# Patient Record
Sex: FEMALE | Race: WHITE
[De-identification: names, ages, dates, MRNs, and addresses within clinical notes are randomized per-mention and may not be internally consistent; named-entity substitution may affect disease eponyms.]

---

## 2020-08-30 ENCOUNTER — HOSPITAL ENCOUNTER (OUTPATIENT)
Dept: HOSPITAL 33 - ED | Age: 60
Setting detail: OBSERVATION
LOS: 1 days | Discharge: HOME | End: 2020-08-31
Attending: INTERNAL MEDICINE | Admitting: INTERNAL MEDICINE
Payer: MEDICAID

## 2020-08-30 DIAGNOSIS — J18.9: Primary | ICD-10-CM

## 2020-08-30 DIAGNOSIS — C34.90: ICD-10-CM

## 2020-08-30 DIAGNOSIS — C50.919: ICD-10-CM

## 2020-08-30 DIAGNOSIS — J44.9: ICD-10-CM

## 2020-08-30 DIAGNOSIS — I10: ICD-10-CM

## 2020-08-30 DIAGNOSIS — E78.5: ICD-10-CM

## 2020-08-30 DIAGNOSIS — Z85.3: ICD-10-CM

## 2020-08-30 DIAGNOSIS — E11.9: ICD-10-CM

## 2020-08-30 DIAGNOSIS — Z79.899: ICD-10-CM

## 2020-08-30 DIAGNOSIS — E78.00: ICD-10-CM

## 2020-08-30 LAB
ALBUMIN SERPL-MCNC: 4.1 G/DL (ref 3.5–5)
ALP SERPL-CCNC: 144 U/L (ref 38–126)
ALT SERPL-CCNC: 41 U/L (ref 0–35)
ANION GAP SERPL CALC-SCNC: 13.1 MEQ/L (ref 5–15)
AST SERPL QL: 81 U/L (ref 14–36)
BASOPHILS # BLD AUTO: 0.04 10*3/UL (ref 0–0.4)
BASOPHILS NFR BLD AUTO: 0.6 % (ref 0–0.4)
BILIRUB BLD-MCNC: 0.3 MG/DL (ref 0.2–1.3)
BLD SMEAR INTERP: YES
BNP SERPL-MCNC: 43.6 PG/ML (ref 0–900)
BUN SERPL-MCNC: 15 MG/DL (ref 7–17)
CALCIUM SPEC-MCNC: 8.8 MG/DL (ref 8.4–10.2)
CHLORIDE SERPL-SCNC: 105 MMOL/L (ref 98–107)
CO2 SERPL-SCNC: 23 MMOL/L (ref 22–30)
CREAT SERPL-MCNC: 0.84 MG/DL (ref 0.52–1.04)
EOSINOPHIL # BLD AUTO: 0.11 10*3/UL (ref 0–0.5)
FLUAV AG NPH QL IA: NEGATIVE
FLUBV AG NPH QL IA: NEGATIVE
GFR SERPLBLD BASED ON 1.73 SQ M-ARVRAT: > 60 ML/MIN
GLUCOSE SERPL-MCNC: 130 MG/DL (ref 74–106)
GLUCOSE UR-MCNC: NEGATIVE MG/DL
HCT VFR BLD AUTO: 28.1 % (ref 35–47)
HGB BLD-MCNC: 8 GM/DL (ref 12–16)
LYMPHOCYTES # SPEC AUTO: 2.1 10*3/UL (ref 1–4.6)
MAGNESIUM SERPL-MCNC: 2.1 MG/DL (ref 1.6–2.3)
MCH RBC QN AUTO: 22.5 PG (ref 26–32)
MCHC RBC AUTO-ENTMCNC: 28.5 G/DL (ref 32–36)
MONOCYTES # BLD AUTO: 0.4 10*3/UL (ref 0–1.3)
PLATELET # BLD AUTO: 158 K/MM3 (ref 150–450)
POTASSIUM SERPLBLD-SCNC: 3.5 MMOL/L (ref 3.5–5.1)
PROT SERPL-MCNC: 7.3 G/DL (ref 6.3–8.2)
PROT UR STRIP-MCNC: NEGATIVE MG/DL
RBC # BLD AUTO: 3.55 M/MM3 (ref 4.1–5.4)
RBC #/AREA URNS HPF: (no result) /HPF (ref 0–2)
RSV AG SPEC QL IA: NEGATIVE
SODIUM SERPL-SCNC: 137 MMOL/L (ref 137–145)
WBC # BLD AUTO: 6.3 K/MM3 (ref 4–10.5)
WBC #/AREA URNS HPF: (no result) /HPF (ref 0–5)

## 2020-08-30 PROCEDURE — 96368 THER/DIAG CONCURRENT INF: CPT

## 2020-08-30 PROCEDURE — 85025 COMPLETE CBC W/AUTO DIFF WBC: CPT

## 2020-08-30 PROCEDURE — 83880 ASSAY OF NATRIURETIC PEPTIDE: CPT

## 2020-08-30 PROCEDURE — 94640 AIRWAY INHALATION TREATMENT: CPT

## 2020-08-30 PROCEDURE — 93041 RHYTHM ECG TRACING: CPT

## 2020-08-30 PROCEDURE — 84484 ASSAY OF TROPONIN QUANT: CPT

## 2020-08-30 PROCEDURE — 83735 ASSAY OF MAGNESIUM: CPT

## 2020-08-30 PROCEDURE — 71045 X-RAY EXAM CHEST 1 VIEW: CPT

## 2020-08-30 PROCEDURE — G0378 HOSPITAL OBSERVATION PER HR: HCPCS

## 2020-08-30 PROCEDURE — 74150 CT ABDOMEN W/O CONTRAST: CPT

## 2020-08-30 PROCEDURE — 96365 THER/PROPH/DIAG IV INF INIT: CPT

## 2020-08-30 PROCEDURE — 94762 N-INVAS EAR/PLS OXIMTRY CONT: CPT

## 2020-08-30 PROCEDURE — 96375 TX/PRO/DX INJ NEW DRUG ADDON: CPT

## 2020-08-30 PROCEDURE — 80053 COMPREHEN METABOLIC PANEL: CPT

## 2020-08-30 PROCEDURE — 87631 RESP VIRUS 3-5 TARGETS: CPT

## 2020-08-30 PROCEDURE — 99291 CRITICAL CARE FIRST HOUR: CPT

## 2020-08-30 PROCEDURE — 87086 URINE CULTURE/COLONY COUNT: CPT

## 2020-08-30 PROCEDURE — 96374 THER/PROPH/DIAG INJ IV PUSH: CPT

## 2020-08-30 PROCEDURE — 93268 ECG RECORD/REVIEW: CPT

## 2020-08-30 PROCEDURE — 36415 COLL VENOUS BLD VENIPUNCTURE: CPT

## 2020-08-30 PROCEDURE — U0003 INFECTIOUS AGENT DETECTION BY NUCLEIC ACID (DNA OR RNA); SEVERE ACUTE RESPIRATORY SYNDROME CORONAVIRUS 2 (SARS-COV-2) (CORONAVIRUS DISEASE [COVID-19]), AMPLIFIED PROBE TECHNIQUE, MAKING USE OF HIGH THROUGHPUT TECHNOLOGIES AS DESCRIBED BY CMS-2020-01-R: HCPCS

## 2020-08-30 PROCEDURE — 81001 URINALYSIS AUTO W/SCOPE: CPT

## 2020-08-30 PROCEDURE — 99285 EMERGENCY DEPT VISIT HI MDM: CPT

## 2020-08-30 PROCEDURE — 83605 ASSAY OF LACTIC ACID: CPT

## 2020-08-30 PROCEDURE — 36000 PLACE NEEDLE IN VEIN: CPT

## 2020-08-30 PROCEDURE — 51702 INSERT TEMP BLADDER CATH: CPT

## 2020-08-30 PROCEDURE — 70450 CT HEAD/BRAIN W/O DYE: CPT

## 2020-08-30 PROCEDURE — 93005 ELECTROCARDIOGRAM TRACING: CPT

## 2020-08-30 PROCEDURE — 87040 BLOOD CULTURE FOR BACTERIA: CPT

## 2020-08-30 RX ADMIN — THERA TABS SCH TAB: TAB at 13:45

## 2020-08-30 RX ADMIN — LORAZEPAM PRN MG: 2 INJECTION, SOLUTION INTRAMUSCULAR; INTRAVENOUS at 13:40

## 2020-08-30 RX ADMIN — MORPHINE SULFATE PRN MG: 4 INJECTION, SOLUTION INTRAMUSCULAR; INTRAVENOUS at 10:55

## 2020-08-30 RX ADMIN — FAMOTIDINE SCH MG: 10 INJECTION INTRAVENOUS at 21:34

## 2020-08-30 RX ADMIN — LORAZEPAM PRN MG: 2 INJECTION, SOLUTION INTRAMUSCULAR; INTRAVENOUS at 22:19

## 2020-08-30 RX ADMIN — VENLAFAXINE HYDROCHLORIDE SCH MG: 75 CAPSULE, EXTENDED RELEASE ORAL at 13:46

## 2020-08-30 RX ADMIN — MORPHINE SULFATE PRN MG: 4 INJECTION, SOLUTION INTRAMUSCULAR; INTRAVENOUS at 18:01

## 2020-08-30 RX ADMIN — MORPHINE SULFATE PRN MG: 4 INJECTION, SOLUTION INTRAMUSCULAR; INTRAVENOUS at 23:49

## 2020-08-30 RX ADMIN — FUROSEMIDE SCH MG: 40 TABLET ORAL at 13:46

## 2020-08-30 RX ADMIN — GABAPENTIN SCH MG: 300 CAPSULE ORAL at 21:34

## 2020-08-30 RX ADMIN — LORAZEPAM PRN MG: 2 INJECTION, SOLUTION INTRAMUSCULAR; INTRAVENOUS at 17:59

## 2020-08-30 RX ADMIN — MORPHINE SULFATE PRN MG: 4 INJECTION, SOLUTION INTRAMUSCULAR; INTRAVENOUS at 14:00

## 2020-08-30 RX ADMIN — MORPHINE SULFATE PRN MG: 4 INJECTION, SOLUTION INTRAMUSCULAR; INTRAVENOUS at 21:33

## 2020-08-30 RX ADMIN — FAMOTIDINE SCH MG: 10 INJECTION INTRAVENOUS at 10:56

## 2020-08-30 RX ADMIN — Medication SCH TAB: at 13:46

## 2020-08-30 RX ADMIN — METOPROLOL SUCCINATE SCH MG: 50 TABLET, EXTENDED RELEASE ORAL at 13:46

## 2020-08-30 RX ADMIN — GABAPENTIN SCH MG: 300 CAPSULE ORAL at 13:46

## 2020-08-30 NOTE — ERPHSYRPT
- History of Present Illness


Source: patient, EMS


Exam Limitations: no limitations


Patient Subjective Stated Complaint: "My chest hurts and I can't breathe."


Triage Nursing Assessment: Pt presented alert et oriented x3 answering questions

appropriately. Pt presented in obvious distress of breathing with noted 4 words 

dyspnea. pt reported recently finishing chemotherapy for lung cancer. Pt 

reported right sided chest pain x4 days with acute increase in pain this 

morning. Pt denied headache, dizziness, visual/auditory changes. Neck supple 

non-tender without lymphadenopathy. Symmetrical chest expansion. heart tones 

regular/clear S2 S2. Lungs with restricted airflow throughout and diffuse fine 

crackles in the bilateral bases. Abdomen obese non-tender with bowel sounds 

present in all quadrants.


Timing/Duration: day(s) (4), gradual onset, worse


Activities at Onset: rest


Severity of Dyspnea-Max: severe


Severity of Dyspnea-Current: moderate


Modifying Factors: Improves With: albuterol nebulizer, coughing, deep breath, 

oxygen


Associated Symptoms: cough, chest pain/discomfort, fever, wheezing, heaviness, 

productive cough


Hx Tetanus, Diphtheria Vaccination/Date Given: Yes


Hx Influenza Vaccination/Date Given: Yes


Hx Pneumococcal Vaccination/Date Given: Yes





<GEORGIA PEREZ - Last Filed: 08/30/20 07:12>





<ASHLEE SHAW - Last Filed: 08/30/20 08:17>





- History of Present Illness


Time Seen by Provider: 08/30/20 05:16


Physician History: 





60 years old female with history of hypertension, hyperlipidemia, lung cancer 

currently on chemotherapy presented in the ER with chief complaint of increasing

shortness of breath and cough productive of yellow-green sputum, moderate in 

amount associated with fever off and on for the last 3 days with a T-max of 103.

 Patient has taken Tylenol lost night and currently afebrile.  Complaining of 

generalized chest soreness more pain in the right lower chest especially with 

deep breathing.  She is given DuoNeb and Solu-Medrol by EMS in route to ER and 

is feeling better now but still have dyspnea. (GEORGIA PEREZ)


Allergies/Adverse Reactions: 








No Known Drug Allergies Allergy (Unverified 08/30/20 05:14)


   





Home Medications: 








Budesonide/Formoterol Fumarate [Budesonide-Formoterol 80-4.5] 2 puff PO BID 

08/30/20 [History]


Gabapentin 1 cap PO TID 08/30/20 [History]


Metoprolol Succinate 1 tab PO DAILY 08/30/20 [History]


Omeprazole 1 cap PO DAILY 08/30/20 [History]


Venlafaxine HCl ER 75 mg*** [Effexor XR 75 MG***] 3 cap PO DAILY 08/30/20 

[History]








Travel Risk





- International Travel


Have you traveled outside of the country in past 3 weeks: No





- Coronavirus Screening


Are you exhibiting any of the following symptoms?: Yes


Symptoms: Cough: New Onset, Shortness of Breath


Close contact with a COVID-19 positive Pt in past 14-21 Days: No





<GEORGIA PEREZ - Last Filed: 08/30/20 07:12>





- Review of Systems


Constitutional: Fever, Chills, Fatigue, Weakness


Eyes: No Symptoms


Ears, Nose, & Throat: No Symptoms


Respiratory: Cough, Dyspnea, Dyspnea on Exertion (BRUNNER), Wheezing


Cardiac: Chest Pain


Abdominal/Gastrointestinal: Nausea


Genitourinary Symptoms: Urinary Retention


Musculoskeletal: Myalgias


Skin: No Symptoms


Neurological: No Symptoms


Psychological: No Symptoms


Endocrine: No Symptoms


Hematologic/Lymphatic: No Symptoms


Immunological/Allergic: No Symptoms





<GEORGIA PEREZ - Last Filed: 08/30/20 07:12>





- Past Medical History


Pertinent Past Medical History: Yes


Neurological History: Migraines, Seizures


ENT History: No Pertinent History


Cardiac History: High Cholesterol, Hypertension, Myocardial Infarction (MI)


Respiratory History: Asthma, COPD, Lung Cancer


Endocrine Medical History: Diabetes Type I


Musculoskeletal History: No Pertinent History


GI Medical History: GERD


 History: Renal Disease


Psycho-Social History: Depression


Female Reproductive Disorders: Breast Cancer, Uterine Cancer





- Past Surgical History


Past Surgical History: Yes


Neuro Surgical History: No Pertinent History


Cardiac: Cardiac Catheterization


Respiratory: No Pertinent History


Gastrointestinal: Cholecystectomy


Genitourinary: No Pertinent History


Musculoskeletal: No Pertinent History


Female Surgical History: Hysterectomy





- Social History


Smoking Status: Current some day smoker


Exposure to second hand smoke: Yes


Drug Use: none


Patient Lives Alone: Yes





<GEORGIA PEREZ - Last Filed: 08/30/20 07:12>





- Physical Exam


General Appearance: no apparent distress, alert


Eye Exam: PERRL/EOMI, eyes nml inspection


Ears, Nose, Throat Exam: hearing grossly normal, pharyngeal erythema


Neck Exam: normal inspection, non-tender, supple


Respiratory Exam: diminished breath sounds, crackles/rales, wheezing


Cardiovascular/Chest Exam: normal heart sounds, regular rate/rhythm


Abdominal/Gastrointestinal Exam: soft, normal bowel sounds, No tenderness


Extremity Exam: non-tender


Skin Exam: normal color


**SpO2 Interpretation**: O2 applied


SpO2: 98


O2 Delivery: Nasal Cannula





<CHRIS,GEORGIA - Last Filed: 08/30/20 07:12>





- Physical Exam


Respiratory Exam: rhonchi





<ASHLEE SHAW - Last Filed: 08/30/20 08:17>





- Nursing Vital Signs


Nursing Vital Signs: 





                               Initial Vital Signs











Temperature  98.1 F   08/30/20 05:12


 


Pulse Rate  92 H  08/30/20 05:12


 


Respiratory Rate  22   08/30/20 05:12


 


Blood Pressure  123/75   08/30/20 05:12


 


O2 Sat by Pulse Oximetry  99   08/30/20 05:12








                                   Pain Scale











Pain Intensity                 4

















- Course


EKG Interpreted by Me: RATE (90), Sinus Rhythm, NORMAL AXIS, NORMAL INTERVALS, 

NORMAL QRS





<CHRIS,GEORGIA - Last Filed: 08/30/20 07:12>





- Radiology Exams


  ** Chest


X-ray Interpretation: Reviewed by me, Infiltrates (right lower lobe)





<VALERIA,ASHLEE - Last Filed: 08/30/20 08:17>


Ordered Tests: 





                               Active Orders 24 hr











 Category Date Time Status


 


 Cardiac Monitor STAT Care  08/30/20 05:21 Active


 


 EKG-ER Only STAT Care  08/30/20 05:20 Active


 


 Eddy [Catheter-Alberton Eddy] STAT Care  08/30/20 07:23 Active


 


 IV Insertion STAT Care  08/30/20 05:20 Active


 


 NPO (ED) STAT Care  08/30/20 05:20 Active


 


 CHEST 1 VIEW (PORTABLE) Stat Exams  08/30/20 05:21 Taken


 


 BLOOD CULTURE Stat Lab  08/30/20 06:12 Received


 


 CBC W DIFF Stat Lab  08/30/20 06:12 Completed


 


 CMP Stat Lab  08/30/20 06:12 Completed


 


 Lactic Acid Stat Lab  08/30/20 05:20 Completed


 


 MAGNESIUM Stat Lab  08/30/20 06:12 Completed


 


 NT PRO BNP Stat Lab  08/30/20 06:12 Completed


 


 TROPONIN Q3H Lab  08/30/20 06:12 Completed


 


 TROPONIN Q3H Lab  08/30/20 08:30 Ordered


 


 TROPONIN Q3H Lab  08/30/20 11:30 Ordered


 


 TROPONIN Q3H Lab  08/30/20 14:30 Ordered


 


 TROPONIN Q3H Lab  08/30/20 17:30 Ordered


 


 UA W/RFX UR CULTURE Stat Lab  08/30/20 07:23 Completed








Medication Summary














Discontinued Medications














Generic Name Dose Route Start Last Admin





  Trade Name Zeenat  PRN Reason Stop Dose Admin


 


Aspirin  324 mg  08/30/20 10:00  08/30/20 05:38





  Ecotrin 81 Mg***  PO  09/29/20 09:59  Not Given





  DAILY LISA  


 


Aspirin  324 mg  08/30/20 05:37  08/30/20 05:38





  Baby Aspirin 81 Mg Chew***  PO  08/30/20 05:38  324 mg





  STAT ONE   Administration


 


Fentanyl Citrate  50 mcg  08/30/20 08:02 





  Sublimaze 100 Mcg/2 Ml***  IV  08/30/20 08:03 





  STAT ONE  


 


Azithromycin  500 mg in 250 mls @ 250 mls/hr  08/30/20 06:46  08/30/20 07:20





  Zithromax 500 Mg/ 250 Ml Nacl Premix  IV  08/30/20 07:45  250 mls/hr





  STAT STA   250 mls/hr





    Administration


 


Ceftriaxone Sodium/Dextrose  2 g in 50 mls @ 100 mls/hr  08/30/20 06:46  

08/30/20 07:19





  Rocephin 2 Gm-D5w 50ml Bag**  IV  08/30/20 07:15  100 mls/hr





  STAT STA   100 mls/hr





    Administration


 


Azithromycin  Confirm  08/30/20 07:18 





  Zithromax 500 Mg/ 250 Ml Nacl Premix  Administered  08/30/20 07:19 





  Dose  





  500 mg in 250 mls @ ud  





  IV  





  .STK-MED ONE  


 


Ceftriaxone Sodium/Dextrose  Confirm  08/30/20 07:18 





  Rocephin 2 Gm-D5w 50ml Bag**  Administered  08/30/20 07:19 





  Dose  





  2 g in 50 mls @ ud  





  IV  





  .STK-MED ONE  


 


Lorazepam  1 mg  08/30/20 08:09 





  Ativan 2 Mg/1 Ml Vial***  IV  08/30/20 08:10 





  STAT ONE  


 


Morphine Sulfate  4 mg  08/30/20 06:34  08/30/20 06:36





  Morphine Sulfate 4 Mg Inj***  IV  08/30/20 06:35  4 mg





  STAT ONE   Administration


 


Morphine Sulfate  Confirm  08/30/20 06:36 





  Morphine Sulfate 4 Mg Inj***  Administered  08/30/20 06:37 





  Dose  





  4 mg  





  .ROUTE  





  .STK-MED ONE  











Lab/Rad Data: 





                           Laboratory Result Diagrams





                                 08/30/20 06:12 





                                 08/30/20 06:12 





                               Laboratory Results











  08/30/20 08/30/20 08/30/20 Range/Units





  07:23 06:12 06:12 


 


WBC     (4.0-10.5)  K/mm3


 


RBC     (4.1-5.4)  M/mm3


 


Hgb     (12.0-16.0)  gm/dl


 


Hct     (35-47)  %


 


MCV     ()  fl


 


MCH     (26-32)  pg


 


MCHC     (32-36)  g/dl


 


RDW     (11.5-14.0)  %


 


Plt Count     (150-450)  K/mm3


 


MPV     (7.5-11.0)  fl


 


Gran %     (36.0-66.0)  %


 


Eos # (Auto)     (0-0.5)  


 


Absolute Lymphs (auto)     (1.0-4.6)  


 


Absolute Monos (auto)     (0.0-1.3)  


 


Lymphocytes %     (24.0-44.0)  %


 


Monocytes %     (0.0-12.0)  %


 


Eosinophils %     (0.00-5.0)  %


 


Basophils %     (0.0-0.4)  %


 


Absolute Granulocytes     (1.4-6.9)  


 


Basophils #     (0-0.4)  


 


Sodium    137  (137-145)  mmol/L


 


Potassium    3.5  (3.5-5.1)  mmol/L


 


Chloride    105  ()  mmol/L


 


Carbon Dioxide    23  (22-30)  mmol/L


 


Anion Gap    13.1  (5-15)  MEQ/L


 


BUN    15  (7-17)  mg/dL


 


Creatinine    0.84  (0.52-1.04)  mg/dL


 


Estimated GFR    > 60.0  ML/MIN


 


Glucose    130 H  ()  mg/dL


 


Lactic Acid     (0.4-2.0)  


 


Calcium    8.8  (8.4-10.2)  mg/dL


 


Magnesium    2.1  (1.6-2.3)  mg/dL


 


Total Bilirubin    0.30  (0.2-1.3)  mg/dL


 


AST    81 H  (14-36)  U/L


 


ALT    41 H  (0-35)  U/L


 


Alkaline Phosphatase    144 H  ()  U/L


 


Troponin I   < 0.012   (0.000-0.034)  ng/mL


 


NT-Pro-B Natriuret Pep    43.6  (0-900)  pg/mL


 


Serum Total Protein    7.3  (6.3-8.2)  g/dL


 


Albumin    4.1  (3.5-5.0)  g/dL


 


Urine Color  STRAW    (YELLOW)  


 


Urine Appearance  CLEAR    (CLEAR)  


 


Urine pH  6.0    (5-6)  


 


Ur Specific Gravity  1.008    (1.005-1.025)  


 


Urine Protein  NEGATIVE    (Negative)  


 


Urine Ketones  NEGATIVE    (NEGATIVE)  


 


Urine Blood  NEGATIVE    (0-5)  Azeem/ul


 


Urine Nitrite  NEGATIVE    (NEGATIVE)  


 


Urine Bilirubin  NEGATIVE    (NEGATIVE)  


 


Urine Urobilinogen  NEGATIVE    (0-1)  mg/dL


 


Ur Leukocyte Esterase  NEGATIVE    (NEGATIVE)  


 


Urine WBC (Auto)  NONE    (0-5)  /HPF


 


Urine RBC (Auto)  NONE    (0-2)  /HPF


 


U Epithel Cells (Auto)  NONE    (FEW)  /HPF


 


Urine Bacteria (Auto)  NONE    (NEGATIVE)  /HPF


 


Urine Culture Reflexed  ORDERED SEPARATELY    (NO)  


 


Urine Glucose  NEGATIVE    (NEGATIVE)  mg/dL


 


Influenza Type A Ag     (NEGATIVE)  


 


Influenza Type B Ag     (NEGATIVE)  


 


RSV (PCR)     (Negative)  














  08/30/20 08/30/20 08/30/20 Range/Units





  06:12 06:10 05:20 


 


WBC  6.3    (4.0-10.5)  K/mm3


 


RBC  3.55 L    (4.1-5.4)  M/mm3


 


Hgb  8.0 L    (12.0-16.0)  gm/dl


 


Hct  28.1 L    (35-47)  %


 


MCV  79.2    ()  fl


 


MCH  22.5 L    (26-32)  pg


 


MCHC  28.5 L    (32-36)  g/dl


 


RDW  20.3 H    (11.5-14.0)  %


 


Plt Count  158    (150-450)  K/mm3


 


MPV  10.0    (7.5-11.0)  fl


 


Gran %  57.9    (36.0-66.0)  %


 


Eos # (Auto)  0.11    (0-0.5)  


 


Absolute Lymphs (auto)  2.10    (1.0-4.6)  


 


Absolute Monos (auto)  0.40    (0.0-1.3)  


 


Lymphocytes %  33.4    (24.0-44.0)  %


 


Monocytes %  6.4    (0.0-12.0)  %


 


Eosinophils %  1.7    (0.00-5.0)  %


 


Basophils %  0.6    (0.0-0.4)  %


 


Absolute Granulocytes  3.64    (1.4-6.9)  


 


Basophils #  0.04    (0-0.4)  


 


Sodium     (137-145)  mmol/L


 


Potassium     (3.5-5.1)  mmol/L


 


Chloride     ()  mmol/L


 


Carbon Dioxide     (22-30)  mmol/L


 


Anion Gap     (5-15)  MEQ/L


 


BUN     (7-17)  mg/dL


 


Creatinine     (0.52-1.04)  mg/dL


 


Estimated GFR     ML/MIN


 


Glucose     ()  mg/dL


 


Lactic Acid    1.3  (0.4-2.0)  


 


Calcium     (8.4-10.2)  mg/dL


 


Magnesium     (1.6-2.3)  mg/dL


 


Total Bilirubin     (0.2-1.3)  mg/dL


 


AST     (14-36)  U/L


 


ALT     (0-35)  U/L


 


Alkaline Phosphatase     ()  U/L


 


Troponin I     (0.000-0.034)  ng/mL


 


NT-Pro-B Natriuret Pep     (0-900)  pg/mL


 


Serum Total Protein     (6.3-8.2)  g/dL


 


Albumin     (3.5-5.0)  g/dL


 


Urine Color     (YELLOW)  


 


Urine Appearance     (CLEAR)  


 


Urine pH     (5-6)  


 


Ur Specific Gravity     (1.005-1.025)  


 


Urine Protein     (Negative)  


 


Urine Ketones     (NEGATIVE)  


 


Urine Blood     (0-5)  Azeem/ul


 


Urine Nitrite     (NEGATIVE)  


 


Urine Bilirubin     (NEGATIVE)  


 


Urine Urobilinogen     (0-1)  mg/dL


 


Ur Leukocyte Esterase     (NEGATIVE)  


 


Urine WBC (Auto)     (0-5)  /HPF


 


Urine RBC (Auto)     (0-2)  /HPF


 


U Epithel Cells (Auto)     (FEW)  /HPF


 


Urine Bacteria (Auto)     (NEGATIVE)  /HPF


 


Urine Culture Reflexed     (NO)  


 


Urine Glucose     (NEGATIVE)  mg/dL


 


Influenza Type A Ag   NEGATIVE   (NEGATIVE)  


 


Influenza Type B Ag   NEGATIVE   (NEGATIVE)  


 


RSV (PCR)   NEGATIVE   (Negative)  














- Progress


Progress: unchanged


Air Movement: fair


Blood Culture(s) Obtained: Yes


Antibiotics given: Yes


Discussed with : Maria Guadalupe


Will see patient in: hospital (observation)


Counseled pt/family regarding: lab results, diagnosis, need for follow-up, rad 

results, smoking cessation





<ASHLEE SHAW - Last Filed: 08/30/20 08:17>





<GEORGIA PERZE - Last Filed: 08/30/20 07:12>





- Departure


Departure Disposition: Observation


Critical Care Time: Yes


Critical Care Time(excluding separately billable procedures): Critical 30-74 

mins





<ASHLEE SHAW - Last Filed: 08/30/20 08:17>





- Departure


Clinical Impression: 


Pneumonia of right lung due to infectious organism


Qualifiers:


 Lung location: lower lobe of lung Qualified Code(s): J18.9 - Pneumonia, 

unspecified organism





Breast cancer in female


Qualifiers:


 Breast location: unspecified site of breast Estrogen receptor status: 

unspecified Laterality: right Qualified Code(s): C50.911 - Malignant neoplasm of

unspecified site of right female breast





Condition: Fair


Referrals: 


ASHLEE SHAW MD [ACTIVE STAFF] -

## 2020-08-30 NOTE — PCM.HP
History of Present Illness





- Chief Complaint


Chief Complaint: shortness of breath for 2-3 days, 


History of Present Illness: 


 is a 60 year old female. with history of hypertension, hyperlipidemia,

lung cancer currently on chemotherapy presented in the ER with chief complaint 

of increasing shortness of breath and cough productive of yellow-green sputum, 

moderate in amount associated with fever off and on for the last 3 days with a 

T-max of 103.  Patient has taken Tylenol lost night and currently afebrile.  

Complaining of generalized chest soreness more pain in the right lower chest 

especially with deep breathing.  She is given DuoNeb and Solu-Medrol by EMS in 

route to ER and is feeling better now but still have dyspnea.








- Review of Systems


Constitutional: No Fever, No Chills


Eyes: No Symptoms


Ears, Nose, & Throat: No Symptoms


Respiratory: Cough, Orthopnea, Short Of Breath


Cardiac: No Chest Pain, No Edema, No Syncope


Abdominal/Gastrointestinal: Abdominal Pain, No Nausea, No Vomiting, No Diarrhea


Genitourinary Symptoms: No Dysuria


Musculoskeletal: No Back Pain, No Neck Pain


Skin: No Rash


Neurological: No Dizziness, No Focal Weakness, No Sensory Changes


Psychological: No Symptoms


Endocrine: No Symptoms


Hematologic/Lymphatic: No Symptoms


Immunological/Allergic: No Symptoms





Medications & Allergies


Home Medications: 


                              Home Medication List





Budesonide/Formoterol Fumarate [Budesonide-Formoterol 80-4.5] 2 puff PO BID 

08/30/20 [History Confirmed 08/30/20]


Gabapentin 1 cap PO TID 08/30/20 [History Confirmed 08/30/20]


Metoprolol Succinate 1 tab PO DAILY 08/30/20 [History Confirmed 08/30/20]


Omeprazole 1 cap PO DAILY 08/30/20 [History Confirmed 08/30/20]


Venlafaxine HCl ER 75 mg*** [Effexor XR 75 MG***] 3 cap PO DAILY 08/30/20 

[History Confirmed 08/30/20]








Allergies/Adverse Reactions: 


                                    Allergies











Allergy/AdvReac Type Severity Reaction Status Date / Time


 


No Known Drug Allergies Allergy   Unverified 08/30/20 05:14














- Past Medical History


Past Medical History: Yes


Neurological History: Migraines, Seizures


ENT History: No Pertinent History


Cardiac History: High Cholesterol, Hypertension, Myocardial Infarction (MI)


Respiratory History: Asthma, COPD, Lung Cancer


Endocrine Medical History: Diabetes Type I


Musculoskelatal History: No Pertinent History


GI Medical History: GERD


 History: Renal Disease


Pyscho-Social History: Depression


Reproductive Disorders: Breast Cancer, Uterine Cancer





- Past Surgical History


Past Surgical History: Yes


Neuro Surgical History: No Pertinent History


Cardiac History: Cardiac Catheterization


Respiratory Surgery: No Pertinent History


GI Surgical History: Cholecystectomy


Genitourinary Surgical Hx: No Pertinent History


Musculskeletal Surgical Hx: No Pertinent History


Female Surgical History: Hysterectomy





- Social History


Smoking Status: Current some day smoker


Exposure to second hand smoke: Yes


Alcohol: Occasionally


Drug Use: none





- Physical Exam


Vital Signs: 


                               Vital Signs - 24 hr











  Temp Pulse Resp BP Pulse Ox


 


 08/30/20 08:24   102 H  18  139/96  99


 


 08/30/20 07:24   101 H  25 H  135/92  99


 


 08/30/20 07:12      98


 


 08/30/20 06:12   97 H  14  131/77  98


 


 08/30/20 05:12  98.1 F  92 H  20  123/75  100











General Appearance: no apparent distress, alert


Neurologic Exam: alert, oriented x 3, cooperative, normal mood/affect, nml 

cerebellar function, nml station & gait, sensation nml, No motor deficits


Eye Exam: PERRL/EOMI, eyes nml inspection


Ears, Nose, Throat Exam: normal ENT inspection, TMs normal, pharynx normal, 

moist mucous membranes


Neck Exam: normal inspection, non-tender, supple, full range of motion


Respiratory Exam: diminished breath sounds, crackles/rales, rhonchi, wheezing, 

No respiratory distress


Cardiovascular Exam: regular rate/rhythm, normal heart sounds, normal peripheral

pulses


Gastrointestinal/Abdomen Exam: soft, normal bowel sounds, No tenderness, No mass


Back Exam: normal inspection, normal range of motion, No CVA tenderness, No 

vertebral tenderness


Extremity Exam: normal inspection, normal range of motion, pelvis stable


Skin Exam: normal color, warm, dry, No rash


Lymphatic Exam: No adenopathy





Results





- Labs


Lab/Micro Results: 


                            Lab Results-Last 24 Hours











  08/30/20 08/30/20 08/30/20 Range/Units





  05:20 06:10 06:12 


 


WBC    6.3  (4.0-10.5)  K/mm3


 


RBC    3.55 L  (4.1-5.4)  M/mm3


 


Hgb    8.0 L  (12.0-16.0)  gm/dl


 


Hct    28.1 L  (35-47)  %


 


MCV    79.2  ()  fl


 


MCH    22.5 L  (26-32)  pg


 


MCHC    28.5 L  (32-36)  g/dl


 


RDW    20.3 H  (11.5-14.0)  %


 


Plt Count    158  (150-450)  K/mm3


 


MPV    10.0  (7.5-11.0)  fl


 


Gran %    57.9  (36.0-66.0)  %


 


Eos # (Auto)    0.11  (0-0.5)  


 


Absolute Lymphs (auto)    2.10  (1.0-4.6)  


 


Absolute Monos (auto)    0.40  (0.0-1.3)  


 


Lymphocytes %    33.4  (24.0-44.0)  %


 


Monocytes %    6.4  (0.0-12.0)  %


 


Eosinophils %    1.7  (0.00-5.0)  %


 


Basophils %    0.6  (0.0-0.4)  %


 


Absolute Granulocytes    3.64  (1.4-6.9)  


 


Basophils #    0.04  (0-0.4)  


 


Sodium     (137-145)  mmol/L


 


Potassium     (3.5-5.1)  mmol/L


 


Chloride     ()  mmol/L


 


Carbon Dioxide     (22-30)  mmol/L


 


Anion Gap     (5-15)  MEQ/L


 


BUN     (7-17)  mg/dL


 


Creatinine     (0.52-1.04)  mg/dL


 


Estimated GFR     ML/MIN


 


Glucose     ()  mg/dL


 


Lactic Acid  1.3    (0.4-2.0)  


 


Calcium     (8.4-10.2)  mg/dL


 


Magnesium     (1.6-2.3)  mg/dL


 


Total Bilirubin     (0.2-1.3)  mg/dL


 


AST     (14-36)  U/L


 


ALT     (0-35)  U/L


 


Alkaline Phosphatase     ()  U/L


 


Troponin I     (0.000-0.034)  ng/mL


 


NT-Pro-B Natriuret Pep     (0-900)  pg/mL


 


Serum Total Protein     (6.3-8.2)  g/dL


 


Albumin     (3.5-5.0)  g/dL


 


Urine Color     (YELLOW)  


 


Urine Appearance     (CLEAR)  


 


Urine pH     (5-6)  


 


Ur Specific Gravity     (1.005-1.025)  


 


Urine Protein     (Negative)  


 


Urine Ketones     (NEGATIVE)  


 


Urine Blood     (0-5)  Azeem/ul


 


Urine Nitrite     (NEGATIVE)  


 


Urine Bilirubin     (NEGATIVE)  


 


Urine Urobilinogen     (0-1)  mg/dL


 


Ur Leukocyte Esterase     (NEGATIVE)  


 


Urine WBC (Auto)     (0-5)  /HPF


 


Urine RBC (Auto)     (0-2)  /HPF


 


U Epithel Cells (Auto)     (FEW)  /HPF


 


Urine Bacteria (Auto)     (NEGATIVE)  /HPF


 


Urine Culture Reflexed     (NO)  


 


Urine Glucose     (NEGATIVE)  mg/dL


 


Influenza Type A Ag   NEGATIVE   (NEGATIVE)  


 


Influenza Type B Ag   NEGATIVE   (NEGATIVE)  


 


RSV (PCR)   NEGATIVE   (Negative)  


 


SARS-CoV-2 (PCR)     (NEGATIVE)  


 


Slides for Path Review    YES  














  08/30/20 08/30/20 08/30/20 Range/Units





  06:12 06:12 07:23 


 


WBC     (4.0-10.5)  K/mm3


 


RBC     (4.1-5.4)  M/mm3


 


Hgb     (12.0-16.0)  gm/dl


 


Hct     (35-47)  %


 


MCV     ()  fl


 


MCH     (26-32)  pg


 


MCHC     (32-36)  g/dl


 


RDW     (11.5-14.0)  %


 


Plt Count     (150-450)  K/mm3


 


MPV     (7.5-11.0)  fl


 


Gran %     (36.0-66.0)  %


 


Eos # (Auto)     (0-0.5)  


 


Absolute Lymphs (auto)     (1.0-4.6)  


 


Absolute Monos (auto)     (0.0-1.3)  


 


Lymphocytes %     (24.0-44.0)  %


 


Monocytes %     (0.0-12.0)  %


 


Eosinophils %     (0.00-5.0)  %


 


Basophils %     (0.0-0.4)  %


 


Absolute Granulocytes     (1.4-6.9)  


 


Basophils #     (0-0.4)  


 


Sodium  137    (137-145)  mmol/L


 


Potassium  3.5    (3.5-5.1)  mmol/L


 


Chloride  105    ()  mmol/L


 


Carbon Dioxide  23    (22-30)  mmol/L


 


Anion Gap  13.1    (5-15)  MEQ/L


 


BUN  15    (7-17)  mg/dL


 


Creatinine  0.84    (0.52-1.04)  mg/dL


 


Estimated GFR  > 60.0    ML/MIN


 


Glucose  130 H    ()  mg/dL


 


Lactic Acid     (0.4-2.0)  


 


Calcium  8.8    (8.4-10.2)  mg/dL


 


Magnesium  2.1    (1.6-2.3)  mg/dL


 


Total Bilirubin  0.30    (0.2-1.3)  mg/dL


 


AST  81 H    (14-36)  U/L


 


ALT  41 H    (0-35)  U/L


 


Alkaline Phosphatase  144 H    ()  U/L


 


Troponin I   < 0.012   (0.000-0.034)  ng/mL


 


NT-Pro-B Natriuret Pep  43.6    (0-900)  pg/mL


 


Serum Total Protein  7.3    (6.3-8.2)  g/dL


 


Albumin  4.1    (3.5-5.0)  g/dL


 


Urine Color    STRAW  (YELLOW)  


 


Urine Appearance    CLEAR  (CLEAR)  


 


Urine pH    6.0  (5-6)  


 


Ur Specific Gravity    1.008  (1.005-1.025)  


 


Urine Protein    NEGATIVE  (Negative)  


 


Urine Ketones    NEGATIVE  (NEGATIVE)  


 


Urine Blood    NEGATIVE  (0-5)  Azeem/ul


 


Urine Nitrite    NEGATIVE  (NEGATIVE)  


 


Urine Bilirubin    NEGATIVE  (NEGATIVE)  


 


Urine Urobilinogen    NEGATIVE  (0-1)  mg/dL


 


Ur Leukocyte Esterase    NEGATIVE  (NEGATIVE)  


 


Urine WBC (Auto)    NONE  (0-5)  /HPF


 


Urine RBC (Auto)    NONE  (0-2)  /HPF


 


U Epithel Cells (Auto)    NONE  (FEW)  /HPF


 


Urine Bacteria (Auto)    NONE  (NEGATIVE)  /HPF


 


Urine Culture Reflexed    ORDERED SEPARATELY  (NO)  


 


Urine Glucose    NEGATIVE  (NEGATIVE)  mg/dL


 


Influenza Type A Ag     (NEGATIVE)  


 


Influenza Type B Ag     (NEGATIVE)  


 


RSV (PCR)     (Negative)  


 


SARS-CoV-2 (PCR)     (NEGATIVE)  


 


Slides for Path Review     














  08/30/20 08/30/20 Range/Units





  08:44 Unknown 


 


WBC    (4.0-10.5)  K/mm3


 


RBC    (4.1-5.4)  M/mm3


 


Hgb    (12.0-16.0)  gm/dl


 


Hct    (35-47)  %


 


MCV    ()  fl


 


MCH    (26-32)  pg


 


MCHC    (32-36)  g/dl


 


RDW    (11.5-14.0)  %


 


Plt Count    (150-450)  K/mm3


 


MPV    (7.5-11.0)  fl


 


Gran %    (36.0-66.0)  %


 


Eos # (Auto)    (0-0.5)  


 


Absolute Lymphs (auto)    (1.0-4.6)  


 


Absolute Monos (auto)    (0.0-1.3)  


 


Lymphocytes %    (24.0-44.0)  %


 


Monocytes %    (0.0-12.0)  %


 


Eosinophils %    (0.00-5.0)  %


 


Basophils %    (0.0-0.4)  %


 


Absolute Granulocytes    (1.4-6.9)  


 


Basophils #    (0-0.4)  


 


Sodium    (137-145)  mmol/L


 


Potassium    (3.5-5.1)  mmol/L


 


Chloride    ()  mmol/L


 


Carbon Dioxide    (22-30)  mmol/L


 


Anion Gap    (5-15)  MEQ/L


 


BUN    (7-17)  mg/dL


 


Creatinine    (0.52-1.04)  mg/dL


 


Estimated GFR    ML/MIN


 


Glucose    ()  mg/dL


 


Lactic Acid    (0.4-2.0)  


 


Calcium    (8.4-10.2)  mg/dL


 


Magnesium    (1.6-2.3)  mg/dL


 


Total Bilirubin    (0.2-1.3)  mg/dL


 


AST    (14-36)  U/L


 


ALT    (0-35)  U/L


 


Alkaline Phosphatase    ()  U/L


 


Troponin I  < 0.012   (0.000-0.034)  ng/mL


 


NT-Pro-B Natriuret Pep    (0-900)  pg/mL


 


Serum Total Protein    (6.3-8.2)  g/dL


 


Albumin    (3.5-5.0)  g/dL


 


Urine Color    (YELLOW)  


 


Urine Appearance    (CLEAR)  


 


Urine pH    (5-6)  


 


Ur Specific Gravity    (1.005-1.025)  


 


Urine Protein    (Negative)  


 


Urine Ketones    (NEGATIVE)  


 


Urine Blood    (0-5)  Azeem/ul


 


Urine Nitrite    (NEGATIVE)  


 


Urine Bilirubin    (NEGATIVE)  


 


Urine Urobilinogen    (0-1)  mg/dL


 


Ur Leukocyte Esterase    (NEGATIVE)  


 


Urine WBC (Auto)    (0-5)  /HPF


 


Urine RBC (Auto)    (0-2)  /HPF


 


U Epithel Cells (Auto)    (FEW)  /HPF


 


Urine Bacteria (Auto)    (NEGATIVE)  /HPF


 


Urine Culture Reflexed    (NO)  


 


Urine Glucose    (NEGATIVE)  mg/dL


 


Influenza Type A Ag    (NEGATIVE)  


 


Influenza Type B Ag    (NEGATIVE)  


 


RSV (PCR)    (Negative)  


 


SARS-CoV-2 (PCR)   NEGATIVE  (NEGATIVE)  


 


Slides for Path Review    














- Radiology Impressions


Radiology Exams & Impressions: 


                              Radiology Procedures











 Category Date Time Status


 


 CHEST 1 VIEW (PORTABLE) Stat Exams  08/30/20 05:21 Taken














- Other Procedures and Tests


                               Respiratory Therapy





08/30/20 10:04


Oxygen Nasal Cannula 2 lpm 


Respiratory Therapy Consult ROUTINE 














Assessment/Plan


(1) Pneumonia of right lung due to infectious organism


Current Visit: Yes   Status: Acute   


Qualifiers: 


   Lung location: lower lobe of lung   Qualified Code(s): J18.9 - Pneumonia, 

unspecified organism   


Assessment & Plan: 


                                    Allergies











Allergy/AdvReac Type Severity Reaction Status Date / Time


 


No Known Drug Allergies Allergy   Unverified 08/30/20 05:14








                           Vital Signs (Last 24 hours)











  Temp Pulse Resp BP Pulse Ox


 


 08/30/20 08:24   102 H  18  139/96  99


 


 08/30/20 07:24   101 H  25 H  135/92  99


 


 08/30/20 07:12      98


 


 08/30/20 06:12   97 H  14  131/77  98


 


 08/30/20 05:12  98.1 F  92 H  20  123/75  100








                                Home Medications











 Medication  Instructions  Recorded  Confirmed  Last Taken  Type


 


Budesonide/Formoterol Fumarate 2 puff PO BID 08/30/20 08/30/20 Unknown History





[Budesonide-Formoterol 80-4.5]     


 


Gabapentin 1 cap PO TID 08/30/20 08/30/20 Unknown History


 


Metoprolol Succinate 1 tab PO DAILY 08/30/20 08/30/20 Unknown History


 


Omeprazole 1 cap PO DAILY 08/30/20 08/30/20 Unknown History


 


Venlafaxine HCl ER 75 mg*** 3 cap PO DAILY 08/30/20 08/30/20 Unknown History





[Effexor XR 75 MG***]     








                               Current Medications











Generic Name Dose Route Start Last Admin





  Trade Name Freq  PRN Reason Stop Dose Admin


 


Acetaminophen  650 mg  08/30/20 10:04 





  Tylenol 325 Mg***  PO  09/29/20 10:03 





  Q4H PRN PRN  





  PAIN AND/OR FEVER  


 


Famotidine  20 mg  08/30/20 10:04 





  Pepcid 20 Mg Vial***  IV  09/29/20 10:03 





  Q12HT LISA  


 


Sodium Chloride  1,000 mls @ 100 mls/hr  08/30/20 10:04 





  Sodium Chloride 0.9% 1000 Ml  IV  09/29/20 10:03 





  .Q10H LISA  


 


Azithromycin  500 mg in 250 mls @ 250 mls/hr  08/31/20 10:00 





  Zithromax 500 Mg/ 250 Ml Nacl Premix  IV  09/30/20 09:59 





  Q24H10 LISA  


 


Ceftriaxone Sodium/Dextrose  1 g in 50 mls @ 100 mls/hr  08/31/20 10:00 





  Rocephin 1 Gm-D5w 50 Ml Bag**  IV  09/30/20 09:59 





  Q24H10 LISA  


 


Morphine Sulfate  4 mg  08/30/20 10:23 





  Morphine Sulfate 4 Mg Inj***  IV  09/04/20 10:22 





  Q2H PRN PRN  





  PAIN  


 


Pantoprazole Sodium  40 mg  08/30/20 10:04 





  Protonix 40 Mg Iv***  IV  09/29/20 10:03 





  Q24H10 LISA  














Discontinued Medications














Generic Name Dose Route Start Last Admin





  Trade Name Freq  PRN Reason Stop Dose Admin


 


Aspirin  324 mg  08/30/20 10:00  08/30/20 05:38





  Ecotrin 81 Mg***  PO  09/29/20 09:59  Not Given





  DAILY LISA  


 


Aspirin  324 mg  08/30/20 05:37  08/30/20 05:38





  Baby Aspirin 81 Mg Chew***  PO  08/30/20 05:38  324 mg





  STAT ONE   Administration


 


Fentanyl Citrate  50 mcg  08/30/20 08:02  08/30/20 08:18





  Sublimaze 100 Mcg/2 Ml***  IV  08/30/20 08:03  50 mcg





  STAT ONE   Administration


 


Fentanyl Citrate  Confirm  08/30/20 08:11 





  Sublimaze 100 Mcg/2 Ml***  Administered  08/30/20 08:12 





  Dose  





  100 mcg  





  .ROUTE  





  .STK-MED ONE  


 


Azithromycin  500 mg in 250 mls @ 250 mls/hr  08/30/20 06:46  08/30/20 08:21





  Zithromax 500 Mg/ 250 Ml Nacl Premix  IV  08/30/20 07:45  Infused





  STAT STA   Infusion


 


Ceftriaxone Sodium/Dextrose  2 g in 50 mls @ 100 mls/hr  08/30/20 06:46  

08/30/20 08:20





  Rocephin 2 Gm-D5w 50ml Bag**  IV  08/30/20 07:15  Infused





  STAT STA   Infusion


 


Azithromycin  Confirm  08/30/20 07:18 





  Zithromax 500 Mg/ 250 Ml Nacl Premix  Administered  08/30/20 07:19 





  Dose  





  500 mg in 250 mls @ ud  





  IV  





  .STK-MED ONE  


 


Ceftriaxone Sodium/Dextrose  Confirm  08/30/20 07:18 





  Rocephin 2 Gm-D5w 50ml Bag**  Administered  08/30/20 07:19 





  Dose  





  2 g in 50 mls @ ud  





  IV  





  .STK-MED ONE  


 


Lorazepam  1 mg  08/30/20 08:09  08/30/20 08:17





  Ativan 2 Mg/1 Ml Vial***  IV  08/30/20 08:10  1 mg





  STAT ONE   Administration


 


Lorazepam  Confirm  08/30/20 08:16 





  Ativan 2 Mg/1 Ml Vial***  Administered  08/30/20 08:17 





  Dose  





  2 mg  





  .ROUTE  





  .STK-MED ONE  


 


Morphine Sulfate  4 mg  08/30/20 06:34  08/30/20 06:36





  Morphine Sulfate 4 Mg Inj***  IV  08/30/20 06:35  4 mg





  STAT ONE   Administration


 


Morphine Sulfate  Confirm  08/30/20 06:36 





  Morphine Sulfate 4 Mg Inj***  Administered  08/30/20 06:37 





  Dose  





  4 mg  





  .ROUTE  





  .STK-MED ONE  








                         Intake & Output (Last 24 hours)











 08/27/20 08/28/20 08/29/20 08/30/20





 11:59 11:59 11:59 11:59


 


Output Total    200


 


Balance    -200


 


Weight    83.915 kg








                      Microbiology Results (Last 24 hours)





08/30/20 07:30   Catherized   Urine Culture - Pending


08/30/20 06:12   Blood   Blood Culture Gram Stain - Pending


08/30/20 06:12   Blood   Blood Culture - Pending


08/30/20 06:12   Blood   Blood Culture Gram Stain - Pending


08/30/20 06:12   Blood   Blood Culture - Pending





                       Laboratory Results (Last 24 hours)











  08/30/20 08/30/20 08/30/20





  Unknown 08:44 07:23


 


WBC   


 


RBC   


 


Hgb   


 


Hct   


 


MCV   


 


MCH   


 


MCHC   


 


RDW   


 


Plt Count   


 


MPV   


 


Gran %   


 


Eos # (Auto)   


 


Absolute Lymphs (auto)   


 


Absolute Monos (auto)   


 


Lymphocytes %   


 


Monocytes %   


 


Eosinophils %   


 


Basophils %   


 


Absolute Granulocytes   


 


Basophils #   


 


Sodium   


 


Potassium   


 


Chloride   


 


Carbon Dioxide   


 


Anion Gap   


 


BUN   


 


Creatinine   


 


Estimated GFR   


 


Glucose   


 


Lactic Acid   


 


Calcium   


 


Magnesium   


 


Total Bilirubin   


 


AST   


 


ALT   


 


Alkaline Phosphatase   


 


Troponin I   < 0.012 


 


NT-Pro-B Natriuret Pep   


 


Serum Total Protein   


 


Albumin   


 


Urine Color    STRAW


 


Urine Appearance    CLEAR


 


Urine pH    6.0


 


Ur Specific Gravity    1.008


 


Urine Protein    NEGATIVE


 


Urine Ketones    NEGATIVE


 


Urine Blood    NEGATIVE


 


Urine Nitrite    NEGATIVE


 


Urine Bilirubin    NEGATIVE


 


Urine Urobilinogen    NEGATIVE


 


Ur Leukocyte Esterase    NEGATIVE


 


Urine WBC (Auto)    NONE


 


Urine RBC (Auto)    NONE


 


U Epithel Cells (Auto)    NONE


 


Urine Bacteria (Auto)    NONE


 


Urine Culture Reflexed    ORDERED SEPARATELY


 


Urine Glucose    NEGATIVE


 


Influenza Type A Ag   


 


Influenza Type B Ag   


 


RSV (PCR)   


 


SARS-CoV-2 (PCR)  NEGATIVE  


 


Slides for Path Review   














  08/30/20 08/30/20 08/30/20





  06:12 06:12 06:12


 


WBC    6.3


 


RBC    3.55 L


 


Hgb    8.0 L


 


Hct    28.1 L


 


MCV    79.2


 


MCH    22.5 L


 


MCHC    28.5 L


 


RDW    20.3 H


 


Plt Count    158


 


MPV    10.0


 


Gran %    57.9


 


Eos # (Auto)    0.11


 


Absolute Lymphs (auto)    2.10


 


Absolute Monos (auto)    0.40


 


Lymphocytes %    33.4


 


Monocytes %    6.4


 


Eosinophils %    1.7


 


Basophils %    0.6


 


Absolute Granulocytes    3.64


 


Basophils #    0.04


 


Sodium   137 


 


Potassium   3.5 


 


Chloride   105 


 


Carbon Dioxide   23 


 


Anion Gap   13.1 


 


BUN   15 


 


Creatinine   0.84 


 


Estimated GFR   > 60.0 


 


Glucose   130 H 


 


Lactic Acid   


 


Calcium   8.8 


 


Magnesium   2.1 


 


Total Bilirubin   0.30 


 


AST   81 H 


 


ALT   41 H 


 


Alkaline Phosphatase   144 H 


 


Troponin I  < 0.012  


 


NT-Pro-B Natriuret Pep   43.6 


 


Serum Total Protein   7.3 


 


Albumin   4.1 


 


Urine Color   


 


Urine Appearance   


 


Urine pH   


 


Ur Specific Gravity   


 


Urine Protein   


 


Urine Ketones   


 


Urine Blood   


 


Urine Nitrite   


 


Urine Bilirubin   


 


Urine Urobilinogen   


 


Ur Leukocyte Esterase   


 


Urine WBC (Auto)   


 


Urine RBC (Auto)   


 


U Epithel Cells (Auto)   


 


Urine Bacteria (Auto)   


 


Urine Culture Reflexed   


 


Urine Glucose   


 


Influenza Type A Ag   


 


Influenza Type B Ag   


 


RSV (PCR)   


 


SARS-CoV-2 (PCR)   


 


Slides for Path Review    YES














  08/30/20 08/30/20





  06:10 05:20


 


WBC  


 


RBC  


 


Hgb  


 


Hct  


 


MCV  


 


MCH  


 


MCHC  


 


RDW  


 


Plt Count  


 


MPV  


 


Gran %  


 


Eos # (Auto)  


 


Absolute Lymphs (auto)  


 


Absolute Monos (auto)  


 


Lymphocytes %  


 


Monocytes %  


 


Eosinophils %  


 


Basophils %  


 


Absolute Granulocytes  


 


Basophils #  


 


Sodium  


 


Potassium  


 


Chloride  


 


Carbon Dioxide  


 


Anion Gap  


 


BUN  


 


Creatinine  


 


Estimated GFR  


 


Glucose  


 


Lactic Acid   1.3


 


Calcium  


 


Magnesium  


 


Total Bilirubin  


 


AST  


 


ALT  


 


Alkaline Phosphatase  


 


Troponin I  


 


NT-Pro-B Natriuret Pep  


 


Serum Total Protein  


 


Albumin  


 


Urine Color  


 


Urine Appearance  


 


Urine pH  


 


Ur Specific Gravity  


 


Urine Protein  


 


Urine Ketones  


 


Urine Blood  


 


Urine Nitrite  


 


Urine Bilirubin  


 


Urine Urobilinogen  


 


Ur Leukocyte Esterase  


 


Urine WBC (Auto)  


 


Urine RBC (Auto)  


 


U Epithel Cells (Auto)  


 


Urine Bacteria (Auto)  


 


Urine Culture Reflexed  


 


Urine Glucose  


 


Influenza Type A Ag  NEGATIVE 


 


Influenza Type B Ag  NEGATIVE 


 


RSV (PCR)  NEGATIVE 


 


SARS-CoV-2 (PCR)  


 


Slides for Path Review  








                             Orders (Last 24 hours)











 Category Date Time Status


 


 Up Ad Krysten ROUTINE Activity  08/30/20 10:04 Active


 


 Call Admit Doctor for Orders ON ADMISSION Care  08/30/20 10:04 Active


 


 Cardiac Monitor STAT Care  08/30/20 05:21 Completed


 


 Code Status Order ROUTINE Care  08/30/20 10:04 Active


 


 EKG-ER Only STAT Care  08/30/20 05:20 Completed


 


 Eddy [Catheter-Quinter Eddy] STAT Care  08/30/20 07:23 Active


 


 IV Care Q6H Care  08/30/20 10:04 Active


 


 IV Insertion STAT Care  08/30/20 05:20 Completed


 


 NPO (ED) STAT Care  08/30/20 05:20 Completed


 


 Place in Observation ROUTINE Care  08/30/20 10:04 Active


 


 Juan C Marcano, Apply ROUTINE Care  08/30/20 10:04 Active


 


 Heart-Healthy  Diet Diet  08/30/20 Lunch Active


 


 CHEST 1 VIEW (PORTABLE) Stat Exams  08/30/20 05:21 Taken


 


 BLOOD CULTURE Stat Lab  08/30/20 06:12 Received


 


 CBC W DIFF AM.LAB Lab  08/31/20 04:00 Ordered


 


 CBC W DIFF Stat Lab  08/30/20 06:12 Completed


 


 CMP AM.LAB Lab  08/31/20 04:00 Ordered


 


 CMP Stat Lab  08/30/20 06:12 Completed


 


 CULTURE,URINE Stat Lab  08/30/20 07:30 Received


 


 FLU/RSV Panel Stat Lab  08/30/20 06:10 Completed


 


 Lactic Acid Stat Lab  08/30/20 05:20 Completed


 


 MAGNESIUM Stat Lab  08/30/20 06:12 Completed


 


 NT PRO BNP Stat Lab  08/30/20 06:12 Completed


 


 TROPONIN Q3H Lab  08/30/20 06:12 Completed


 


 TROPONIN Q3H Lab  08/30/20 08:44 Completed


 


 TROPONIN Q3H Lab  08/30/20 11:30 Ordered


 


 TROPONIN Q3H Lab  08/30/20 14:30 Ordered


 


 TROPONIN Q3H Lab  08/30/20 17:30 Ordered


 


 UA W/RFX UR CULTURE Stat Lab  08/30/20 07:23 Completed


 


 Acetaminophen 325 mg*** [Tylenol 325 mg***] Med  08/30/20 10:04 Active





 650 mg PO Q4H PRN PRN   


 


 Aspirin 81 gm Chew*** [Baby Aspirin 81 mg Chew***] Med  08/30/20 05:37 

Discontinued





 324 mg PO STAT ONE   


 


 Aspirin EC 81 mg*** [Ecotrin 81 mg***] Med  08/30/20 10:00 Discontinued





 324 mg PO DAILY   


 


 Azithromycin 500 mg/250 ml [Zithromax 500 MG/ 250 ML Med  08/31/20 10:00 Active





 NaCl Premix]   





 500 mg in 250 ml IV Q24H10   


 


 Azithromycin 500 mg/250 ml [Zithromax 500 MG/ 250 ML Med  08/30/20 06:46 

Discontinued





 NaCl Premix]   





 500 mg in 250 ml IV STAT   


 


 Azithromycin 500 mg/250 ml [Zithromax 500 MG/ 250 ML Med  08/30/20 07:18 

Discontinued





 NaCl Premix]   





 500 mg in 250 ml IV UD   


 


 Ceftriaxone 1 GM/50 ML PREMIX* [ROCEPHIN 1 Gm-D5w 50 ml Med  08/31/20 10:00 

Active





 Bag**]   





 1 g in 50 ml IV Q24H10   


 


 Ceftriaxone 2 GM/50 ML PREMIX* [ROCEPHIN 2 Gm-D5w 50ML Med  08/30/20 06:46 

Discontinued





 BAG**]   





 2 g in 50 ml IV STAT   


 


 Ceftriaxone 2 GM/50 ML PREMIX* [ROCEPHIN 2 Gm-D5w 50ML Med  08/30/20 07:18 

Discontinued





 BAG**]   





 2 g in 50 ml IV UD   


 


 Famotidine 20 mg Vial*** [Pepcid 20 MG VIAL***] Med  08/30/20 10:04 Active





 20 mg IV Q12HT   


 


 Fentanyl Citrate 100 Mcg/2 ml* [Sublimaze 100 Mcg/2 ml* Med  08/30/20 08:11 

Discontinued





 **]   





 100 mcg .ROUTE .STK-MED ONE   


 


 Fentanyl Citrate 100 Mcg/2 ml* [Sublimaze 100 Mcg/2 ml* Med  08/30/20 08:02 

Discontinued





 **]   





 50 mcg IV STAT ONE   


 


 Lorazepam 2 mg/1 ml*** [Ativan 2 MG/1 ML VIAL***] Med  08/30/20 08:09 

Discontinued





 1 mg IV STAT ONE   


 


 Lorazepam 2 mg/1 ml*** [Ativan 2 MG/1 ML VIAL***] Med  08/30/20 08:16 

Discontinued





 2 mg .ROUTE .STK-MED ONE   


 


 Morphine Sulfate 4 mg Inj*** Med  08/30/20 06:36 Discontinued





 4 mg .ROUTE .STK-MED ONE   


 


 Morphine Sulfate 4 mg Inj*** Med  08/30/20 10:23 Active





 4 mg IV Q2H PRN PRN   


 


 Morphine Sulfate 4 mg Inj*** Med  08/30/20 06:34 Discontinued





 4 mg IV STAT ONE   


 


 NaCl 0.9% 1000 ml [Sodium Chloride 0.9% 1000 ML] 1,000 Med  08/30/20 10:04 

Active





 ml   





  mls/hr   


 


 Pantoprazole 40 mg*** [Protonix 40 mg IV***] Med  08/30/20 10:04 Active





 40 mg IV Q24H10   


 


 Oxygen Nasal Cannula 2 lpm RT  08/30/20 10:04 Active


 


 Pulse Oximetry CONTINUOUS RT  08/30/20 10:04 Active


 


 Respiratory Therapy Consult ROUTINE RT  08/30/20 10:04 Active


 


 Transfer Order Routine Transfer  08/30/20 Completed











Code(s): J18.9 - PNEUMONIA, UNSPECIFIED ORGANISM   





(2) Breast cancer in female


Current Visit: Yes   Status: Acute   


Qualifiers: 


   Breast location: unspecified site of breast   Estrogen receptor status: 

unspecified   Laterality: right   Qualified Code(s): C50.911 - Malignant 

neoplasm of unspecified site of right female breast   


Code(s): C50.919 - MALIGNANT NEOPLASM OF UNSP SITE OF UNSPECIFIED FEMALE BREAST

## 2020-08-30 NOTE — XRAY
Indication: Short of breath.  Current chemotherapy.



Comparison: None



Portable chest demonstrates hazy left base infiltrate versus atelectasis and

tiny left upper lobe calcified granuloma.  Remaining heart and lungs

unremarkable.  Bony thorax intact.

## 2020-08-31 VITALS — HEART RATE: 97 BPM

## 2020-08-31 VITALS — DIASTOLIC BLOOD PRESSURE: 97 MMHG | SYSTOLIC BLOOD PRESSURE: 180 MMHG

## 2020-08-31 VITALS — OXYGEN SATURATION: 93 %

## 2020-08-31 RX ADMIN — GABAPENTIN SCH: 300 CAPSULE ORAL at 11:29

## 2020-08-31 RX ADMIN — Medication SCH: at 11:30

## 2020-08-31 RX ADMIN — FUROSEMIDE SCH: 40 TABLET ORAL at 11:29

## 2020-08-31 RX ADMIN — VENLAFAXINE HYDROCHLORIDE SCH: 75 CAPSULE, EXTENDED RELEASE ORAL at 11:29

## 2020-08-31 RX ADMIN — FAMOTIDINE SCH: 10 INJECTION INTRAVENOUS at 11:29

## 2020-08-31 RX ADMIN — LORAZEPAM PRN MG: 2 INJECTION, SOLUTION INTRAMUSCULAR; INTRAVENOUS at 02:20

## 2020-08-31 RX ADMIN — LORAZEPAM PRN MG: 2 INJECTION, SOLUTION INTRAMUSCULAR; INTRAVENOUS at 06:15

## 2020-08-31 RX ADMIN — METOPROLOL SUCCINATE SCH: 50 TABLET, EXTENDED RELEASE ORAL at 11:30

## 2020-08-31 RX ADMIN — THERA TABS SCH: TAB at 11:30

## 2020-08-31 RX ADMIN — MORPHINE SULFATE PRN MG: 4 INJECTION, SOLUTION INTRAMUSCULAR; INTRAVENOUS at 01:55

## 2020-08-31 NOTE — XRAY
Indication: Confusion.  Head injury following fall.  History of lung cancer.



Multiple contiguous axial images obtained through the head without contrast.



Comparison: None



Age-appropriate global atrophy and minimal periventricular degenerative

micro-ischemia bilaterally.  No acute intracranial hemorrhage, abnormal

extra-axial fluid collection, or mass effect.  Fourth ventricle is midline

without hydrocephalus.  Bony calvarium intact.  Visualized paranasal sinuses

and mastoid air cells are clear.



Impression: Normal aging brain including atrophy and degenerative

micro-ischemia.  No acute intracranial abnormalities.

## 2020-08-31 NOTE — XRAY
Indication: Upper abdomen pain following fall.  History lung cancer.



Multiple contiguous axial images obtained through the abdomen only without

contrast as ordered.



Comparison: None



Lung bases demonstrates minimal bilateral atelectasis/scarring without

infiltrate or effusion.  Tiny 2 mm right middle lobe and 7 mm right lower lobe

subpleural noncalcified indeterminant nodules.  Heart is not enlarged.



Noncontrasted stomach and visualized bowel loops appear nonobstructed.  Large

amount of fecal debris throughout the colon greatest in the right hemicolon.

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

Chilaiditi's syndrome.  No free fluid/air.  Mild cirrhotic appearing liver.

Spleen is enlarged measuring 14.5 cm.



Patient reports appendectomy, cholecystectomy, and hysterectomy.  Remaining

spleen, adrenal glands, kidneys, and proximal ureters are unremarkable for

noncontrast exam.  Mild aortoiliac calcifications without AAA.



Osseous structures demonstrates mild/moderate lower lumbar degenerative

spondylosis and old nonunited left L2 transverse process fracture.



Impression:

1.  Right middle and right lower lobe indeterminate noncalcified micronodules

in this patient with history of lung cancer.  Outside comparison studies

recommended if available.  If not, CT chest recommended to establish baseline

with follow-up per Fleischner guidelines.

2.  Marked fecal stasis.  Also right hemicolon interposed between the liver

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

3.  Splenomegaly, cirrhotic liver without ascites, and chronic bony findings.

## 2020-08-31 NOTE — PCM.DS
Discharge Summary


Date of Admission: 


08/30/20 09:37





Admitting Physician: 


ASHLEE SHAW





Primary Care Provider: 


NO FAMILY DOCTOR








Allergies


Allergies





No Known Drug Allergies Allergy (Verified 08/30/20 11:09)


   











Hospital Summary





- Hospital Course


Hospital Course: 








                                 Chief Complaint





Diagnosis                        shortness of breath for 2-3 days,





                                    Allergies











Allergy/AdvReac Type Severity Reaction Status Date / Time


 


No Known Drug Allergies Allergy   Verified 08/30/20 11:09








                           Vital Signs (Last 24 hours)











  Temp Pulse Resp BP Pulse Ox


 


 08/31/20 07:30      93 L


 


 08/31/20 07:07      93 L


 


 08/31/20 00:52     180/97 


 


 08/31/20 00:39  98.7 F  97 H  20  191/101  98


 


 08/30/20 21:51   103 H  22   94 L


 


 08/30/20 20:00  98.6 F  98 H  18  185/95  98


 


 08/30/20 15:44  98.5 F  105 H  20  176/91  94 L


 


 08/30/20 14:33   96 H  18   98


 


 08/30/20 11:55  98.2 F  105 H  20  169/84  98








                                Home Medications











 Medication  Instructions  Recorded  Confirmed  Last Taken  Type


 


Budesonide/Formoterol Fumarate 2 puffs IH QAM 08/30/20 08/30/20 08/29/20 History





[Symbicort 80-4.5 Mcg Inhaler]     


 


Furosemide [Lasix] 40 mg PO DAILY 08/30/20 08/30/20 08/29/20 History


 


Gabapentin 600 mg PO TID 08/30/20 08/30/20 08/29/20 History





    . 


 


Metoprolol Succinate 50 mg PO DAILY 08/30/20 08/30/20 08/29/20 History


 


Multivitamin 1 tab PO DAILY 08/30/20 08/30/20 08/29/20 History


 


Omeprazole 40 mg PO DAILY 08/30/20 08/30/20 08/29/20 History


 


Venlafaxine HCl ER 75 mg*** 225 mg PO DAILY 08/30/20 08/30/20 08/29/20 History





[Effexor XR 75 MG***]     


 


Vitamin B Complex 2,000 mg PO DAILY 08/30/20 08/30/20 08/29/20 History


 


cephALEXin [Cephalexin] 500 mg PO QID 7 Days #28 tablet 08/31/20  Unknown Rx








                               Current Medications











Generic Name Dose Route Start Last Admin





  Trade Name Freq  PRN Reason Stop Dose Admin


 


Acetaminophen  650 mg  08/30/20 10:04 





  Tylenol 325 Mg***  PO  09/29/20 10:03 





  Q4H PRN PRN  





  PAIN AND/OR FEVER  


 


Albuterol Sulfate  2.5 mg  08/30/20 11:15  08/30/20 20:04





  Proventil 2.5 Mg/3 Ml Neb***  IH  09/29/20 11:14  2.5 mg





  Q4H PRN PRN   Administration





  SHORTNESS OF BREATH/WHEEZING  


 


Famotidine  20 mg  08/30/20 10:04  08/30/20 21:34





  Pepcid 20 Mg Vial***  IV  09/29/20 10:03  20 mg





  Q12HT LISA   Administration


 


Furosemide  40 mg  08/30/20 14:00  08/30/20 13:46





  Lasix 40 Mg***  PO  09/29/20 13:59  40 mg





  DAILY LISA   Administration


 


Gabapentin  600 mg  08/30/20 15:00  08/30/20 21:34





  Neurontin 300 Mg***  PO  09/29/20 14:59  600 mg





  TID LISA   Administration


 


Sodium Chloride  1,000 mls @ 100 mls/hr  08/30/20 10:04  08/31/20 07:37





  Sodium Chloride 0.9% 1000 Ml  IV  09/29/20 10:03  Not Given





  .Q10H LISA  


 


Azithromycin  500 mg in 250 mls @ 250 mls/hr  08/31/20 10:00 





  Zithromax 500 Mg/ 250 Ml Nacl Premix  IV  09/30/20 09:59 





  Q24H10 LISA  


 


Ceftriaxone Sodium/Dextrose  1 g in 50 mls @ 100 mls/hr  08/31/20 10:00 





  Rocephin 1 Gm-D5w 50 Ml Bag**  IV  09/30/20 09:59 





  Q24H10 LISA  


 


Lorazepam  1 mg  08/30/20 12:24  08/31/20 06:15





  Ativan 2 Mg/1 Ml Vial***  IV  09/29/20 12:23  1 mg





  Q4H PRN PRN   Administration





  ANXIETY/AGITATION  


 


Metoprolol Succinate  50 mg  08/30/20 14:00  08/30/20 13:46





  Toprol Xl 50 Mg***  PO  09/29/20 13:59  50 mg





  DAILY LISA   Administration


 


Morphine Sulfate  4 mg  08/30/20 10:23  08/31/20 01:55





  Morphine Sulfate 4 Mg Inj***  IV  09/04/20 10:22  4 mg





  Q2H PRN PRN   Administration





  PAIN  


 


Multivitamins  1 tab  08/30/20 14:00  08/30/20 13:46





  Barbara-Bee With C  PO  09/29/20 13:59  1 tab





  DAILY LISA   Administration


 


Multivitamins Therapeutic  1 tab  08/30/20 14:00  08/30/20 13:45





  Theragran Multivitamin***  PO  09/29/20 13:59  1 tab





  DAILY LISA   Administration


 


Nicotine  21 mg  08/30/20 15:45  08/30/20 16:52





  Nicoderm Cq 21 Mg***  TOP  09/29/20 15:44  21 mg





  Q24H LISA   Administration


 


Oxycodone/Acetaminophen  1 tab  08/31/20 08:47  08/31/20 08:58





  Oxycodone-Acetaminophen ***  PO  09/05/20 08:46  1 tab





  Q4H PRN PRN   Administration





  PAIN  


 


Pantoprazole Sodium  40 mg  08/31/20 10:00 





  Protonix 40mg Tablet***  PO  09/30/20 09:59 





  DAILY [facility]  


 


Patient Own Medication  2 each  08/30/20 20:15  08/31/20 07:06





  Patient Own Medication  IH  09/29/20 18:59  Not Given





  BIDRT [facility]  


 


Venlafaxine HCl  225 mg  08/30/20 14:00  08/30/20 13:46





  Effexor Xr 75 Mg***  PO  09/29/20 13:59  225 mg





  DAILY LISA   Administration














Discontinued Medications














Generic Name Dose Route Start Last Admin





  Trade Name Freq  PRN Reason Stop Dose Admin


 


Aspirin  324 mg  08/30/20 10:00  08/30/20 05:38





  Ecotrin 81 Mg***  PO  09/29/20 09:59  Not Given





  DAILY LISA  


 


Aspirin  324 mg  08/30/20 05:37  08/30/20 05:38





  Baby Aspirin 81 Mg Chew***  PO  08/30/20 05:38  324 mg





  STAT ONE   Administration


 


Fentanyl Citrate  50 mcg  08/30/20 08:02  08/30/20 08:18





  Sublimaze 100 Mcg/2 Ml***  IV  08/30/20 08:03  50 mcg





  STAT ONE   Administration


 


Fentanyl Citrate  Confirm  08/30/20 08:11 





  Sublimaze 100 Mcg/2 Ml***  Administered  08/30/20 08:12 





  Dose  





  100 mcg  





  .ROUTE  





  .STK-MED ONE  


 


Azithromycin  500 mg in 250 mls @ 250 mls/hr  08/30/20 06:46  08/30/20 08:21





  Zithromax 500 Mg/ 250 Ml Nacl Premix  IV  08/30/20 07:45  Infused





  STAT STA   Infusion


 


Ceftriaxone Sodium/Dextrose  2 g in 50 mls @ 100 mls/hr  08/30/20 06:46  

08/30/20 08:20





  Rocephin 2 Gm-D5w 50ml Bag**  IV  08/30/20 07:15  Infused





  STAT STA   Infusion


 


Azithromycin  Confirm  08/30/20 07:18 





  Zithromax 500 Mg/ 250 Ml Nacl Premix  Administered  08/30/20 07:19 





  Dose  





  500 mg in 250 mls @ ud  





  IV  





  .STK-MED ONE  


 


Ceftriaxone Sodium/Dextrose  Confirm  08/30/20 07:18 





  Rocephin 2 Gm-D5w 50ml Bag**  Administered  08/30/20 07:19 





  Dose  





  2 g in 50 mls @ ud  





  IV  





  .STK-MED ONE  


 


Lorazepam  1 mg  08/30/20 08:09  08/30/20 08:17





  Ativan 2 Mg/1 Ml Vial***  IV  08/30/20 08:10  1 mg





  STAT ONE   Administration


 


Lorazepam  Confirm  08/30/20 08:16 





  Ativan 2 Mg/1 Ml Vial***  Administered  08/30/20 08:17 





  Dose  





  2 mg  





  .ROUTE  





  .STK-MED ONE  


 


Lorazepam  2 mg  08/31/20 03:21  08/31/20 03:30





  Ativan 2 Mg/1 Ml Vial***  IV  08/31/20 03:22  2 mg





  STAT ONE   Administration


 


Miscellaneous Information  1 each  08/30/20 13:30 





  Medication Intervention  MC  09/29/20 13:29 





  .RT TO CHECK ON LISA  


 


Morphine Sulfate  4 mg  08/30/20 06:34  08/30/20 06:36





  Morphine Sulfate 4 Mg Inj***  IV  08/30/20 06:35  4 mg





  STAT ONE   Administration


 


Morphine Sulfate  Confirm  08/30/20 06:36 





  Morphine Sulfate 4 Mg Inj***  Administered  08/30/20 06:37 





  Dose  





  4 mg  





  .ROUTE  





  .STK-MED ONE  


 


Pantoprazole Sodium  40 mg  08/30/20 10:04  08/30/20 10:56





  Protonix 40 Mg Iv***  IV  09/29/20 10:03  40 mg





  Q24H10 LISA   Administration


 


Symbicort 80/4.5  2 each  08/31/20 07:00 





  Inhaler    09/30/20 06:59 





  0700 [facility]  


 


Patient Own Medication  2 each  08/30/20 20:07 





  Patient Own Medication    09/29/20 18:59 





  0700 LISA  








                         Intake & Output (Last 24 hours)











 08/28/20 08/29/20 08/30/20 08/31/20





 11:59 11:59 11:59 11:59


 


Intake Total    1080


 


Output Total   200 5050


 


Balance   -200 -3970


 


Weight   109 kg 








                      Microbiology Results (Last 24 hours)





08/30/20 06:12   Blood   Blood Culture Gram Stain - Pending


08/30/20 06:12   Blood   Blood Culture - Preliminary


                            NO GROWTH TO DATE


08/30/20 06:12   Blood   Blood Culture Gram Stain - Pending


08/30/20 06:12   Blood   Blood Culture - Preliminary


                            NO GROWTH TO DATE


08/30/20 07:30   Catherized   Urine Culture - Preliminary


                            NO GROWTH TO DATE





                       Laboratory Results (Last 24 hours)











  08/30/20 08/30/20 08/30/20





  17:45 14:30 11:40


 


Troponin I  < 0.012  < 0.012  < 0.012








                             Orders (Last 24 hours)











 Category Date Time Status


 


 Telemetry q6h Care  08/30/20 12:26 Active


 


 Cardio-Pulmonary Rehab .as ordered Cons  08/30/20 10:33 Active


 


 /Discharge Plan ROUTINE Cons  08/30/20 10:33 Active


 


 Heart-Healthy  Diet Diet  08/30/20 Lunch Completed


 


 NPO Diet  08/31/20 06:35 Active


 


 Nutritional Admission Screen ONCE Diet  08/30/20 10:33 Active


 


 Discharge Routine Discharge  08/31/20 Ordered


 


 Discharge/Telephone Order Routine Discharge  08/31/20 Active


 


 ABDOMEN WITHOUT CONTRAST [CT] Urgent Exams  08/31/20 06:33 Completed


 


 HEAD WITHOUT CONTRAST [CT] Urgent Exams  08/31/20 06:33 Completed


 


 TROPONIN Q3H Lab  08/30/20 11:40 Completed


 


 TROPONIN Q3H Lab  08/30/20 14:30 Completed


 


 TROPONIN Q3H Lab  08/30/20 17:45 Completed


 


 Albuterol 2.5 mg/3 ml Neb*** [Proventil 2.5 mg/3 ml Neb Med  08/30/20 11:15 

Active





 ***]   





 2.5 mg IH Q4H PRN PRN   


 


 Azithromycin 500 mg/250 ml [Zithromax 500 MG/ 250 ML Med  08/31/20 10:00 Active





 NaCl Premix]   





 500 mg in 250 ml IV Q24H10   


 


 Ceftriaxone 1 GM/50 ML PREMIX* [ROCEPHIN 1 Gm-D5w 50 ml Med  08/31/20 10:00 

Active





 Bag**]   





 1 g in 50 ml IV Q24H10   


 


 Furosemide 40 mg*** [Lasix 40 MG***] Med  08/30/20 14:00 Active





 40 mg PO DAILY   


 


 Gabapentin 300 mg*** [Neurontin 300 mg***] Med  08/30/20 15:00 Active





 600 mg PO TID   


 


 Lorazepam 2 mg/1 ml*** [Ativan 2 MG/1 ML VIAL***] Med  08/30/20 12:24 Active





 1 mg IV Q4H PRN PRN   


 


 Lorazepam 2 mg/1 ml*** [Ativan 2 MG/1 ML VIAL***] Med  08/31/20 03:21 Di

scontinued





 2 mg IV STAT ONE   


 


 Medication Intervention Med  08/30/20 13:30 Discontinued





 1 each MC .RT TO CHECK ON   


 


 Metoprolol Succinate 50 mg*** [Toprol Xl 50 MG***] Med  08/30/20 14:00 Active





 50 mg PO DAILY   


 


 Multivitamins,Therapeutic Tab* [Theragran Multivitamin* Med  08/30/20 14:00 

Active





 **]   





 1 tab PO DAILY   


 


 Nicotine 21 mg** [Nicoderm CQ 21 MG***] Med  08/30/20 15:45 Active





 21 mg TOP Q24H   


 


 Oxycodone / APAP 10/325 mg*** [Oxycodone-Acetaminophen Med  08/31/20 08:47 

Active





 ***]   





 1 tab PO Q4H PRN PRN   


 


 PANTOPRAZOLE 40 mg Tablet*** [Protonix 40MG Tablet***] Med  08/31/20 10:00 

Active





 40 mg PO DAILY   


 


 Patient Own Med [Patient Own Medication] Med  08/30/20 20:07 Discontinued





 2 each  0700   


 


 Patient Own Med [Patient Own Medication] Med  08/31/20 07:00 Discontinued





 2 each IH 0700   


 


 Patient Own Med [Patient Own Medication] Med  08/30/20 20:15 Active





 2 each IH BIDRT   


 


 Venlafaxine HCl ER 75 mg*** [Effexor XR 75 MG***] Med  08/30/20 14:00 Active





 225 mg PO DAILY   


 


 Vitamin B Comp W-C*** [Barbara-Bee with C] Med  08/30/20 14:00 Active





 1 tab PO DAILY   


 


 RT Screen per Nursing Assess ONCE RT  08/30/20 10:33 Completed


 


 Respiratory MDI BID RT  08/30/20 19:00 Active


 


 Respiratory Therapy Assessment DAILY RT  08/30/20 14:37 Active


 


 Smoking Cessation Education ONCE RT  08/30/20 10:33 Completed








                       Patient Care Notes (Last 24 hours)





08/31/20 10:11 Nursing Note by Celestino Gabriel


PT ANXIOUS, ROAMING HALLS, STATING SHE NEEDS TO GO HOME SHE NEEDS HER PAIN 

MEDICINE A CIGARETTE AND HAS THINGS SHE NEEDS TO DO. DR SHAW WAS CALLED AND 

NEW ORDER REC'D FOR PERCOCET 10MG Q4H PRN, GAVE PT PAIN MED AND SHE BECAME LESS 

ANXIOUS. PT AGREED TO HAVE CT SCAN DONE. SCANS COMPLETED, PT RETURNED TO ROOM, 

IN BED AND RESTING 





Initialized on 08/31/20 10:11 - END OF NOTE








08/31/20 07:30 Nursing Note by Paula Hawk


Patient up wondering in hallway and trying to leave facility.  Uncooperative and

unable to get vital signs.





Initialized on 08/31/20 07:30 - END OF NOTE








08/31/20 03:08 SBAR Note by Claudine Etienne


SITUATION





I am calling about JEAN FELICIANO the patient's code status is Full Code





The problem I am calling about is: 





Patient cont to c/o pain, very anxious.  rating pain 10/10 right lower abdominal

pain that radiates into right side. Patient seeing "men up in the ceiling that 

has been spying on her all night".  believes she has chased them away.  Patient 

has been up in the bathroom all night, hanging f/c drainage bag on hook of IV 

pole or bathroom door hinge.  At one point patient walked away from drainage bag

still hanging on the door.  Nurse stopped her before she pulled f/c out.  

Patient stated well that's what I'm trying to do."  Noted toilet paper with 

blood on it in the trash can in the bathroom.  Patient stated she had been 

pulling on the catheter because "it's not working." Urine now blood tinged. 





Staff stated they had just emptied 600 ml  of urine from drainage bag and 500 cc

present in drainage bag at this time.  Shortly found patient back in bathroom 

with drainage bag hanging again on IV pole.  drainage bag unclipped from laurent 

and metal clip stretched open with urine all over the floor.  Patient anxious 

trying to clean up urine from floor stating the toilet overflowed.  Patient was 

assisted back to bed after she insisted on fully donning her street clothes. B/P

174/101, 99, 20.  Previous B/P 191/101.   patient repeatedly asking for 

medications.  








ASSESSMENT








RECOMMENDATION





 Physician notified at 0308 New Orders received: Do not give any more morphine -

causing confusion and hallucinations.  Give Additional 2mg Ativan IVP now.





                           Vital Signs (Last 4 hours)











  Temp Pulse Resp BP Pulse Ox


 


 08/31/20 00:52     180/97 


 


 08/31/20 00:39  98.7 F  97 H  20  191/101  98











                              Diagnois, Code Status





Date of Arrival on Unit          08/30/20


Admitted From                    Emergency Dept


Diagnosis                        shortness of breath for 2-3 days,


Resucitation Status              Full Code





                           Intake and Output 24 Hours











 08/30/20 08/31/20





 06:59 06:59


 


Intake Total  840


 


Output Total  5250


 


Balance  -4410


 


Weight 83.915 kg 109 kg


 


Intake:  


 


  Intake, Oral Amount  840


 


Output:  


 


  Output, Urine Amount  200


 


  Output, Eddy:  5050


 


Other:  


 


  Number of Voids  2


 


  Number of Bowel Movements  1








                               Physical Assessment











Anxiety Level                  Awake,Calm,Moderate


 


Mental Status                  Alert


 


Patient Orientation            Person,Place,Time


 


Coma Scale Total               15


 


Breath Sounds [Anterior/       Clear,Diminished





Posterior Bilateral Throughout 





]                              


 


Breath Sounds [Bilateral]      Diminished


 


Breath Sounds [Posterior Bases Crackles





]                              


 


Breath Sounds [Anterior/       Clear





Posterior Upper Lobe]          


 


Cardiac Rhythm-[facility]            Sinus Rhythm


 


Bowel Sounds [All Quadrants]   Present


 


Abdomen Description            Soft,Non-Tender


 


Date Eddy Cath Inserted       8/30/20


 


Urine Appearance               Clear


 


Urine Color                    Yellow


 


Skin Color                     Pink


 


Skin Temperature               Warm











                           Pain Scale (Last 24 Hours)











Pain Intensity                 10


 


Pain Intensity                 10


 


Pain Intensity                 10


 


Pain Intensity                 10


 


Pain Intensity                 10


 


Pain Intensity                 9


 


Pain Intensity                 9


 


Pain Intensity                 8


 


Pain Intensity                 2


 


Pain Intensity                 8


 


Pain Intensity                 8


 


Pain Intensity                 7


 


Pain Intensity                 10


 


Pain Intensity                 10


 


Pain Intensity                 8


 


Pain Intensity                 8


 


Pain Intensity                 8


 


Pain Intensity                 8


 


Pain Intensity                 4


 


Pain Intensity                 4


 


Pain Intensity                 10


 


Pain Intensity                 6


 


Pain Intensity                 6











                              PAST MEDICAL HISTORY





Neurological History             Migraines,Seizures


ENT History                      No Pertinent History


Endocrine Medical History        Diabetes Type I


Respiratory History              Asthma,COPD,Lung Cancer


Cardiac History                  High Cholesterol,Hypertension,Myocardial


                                 Infarction (MI


GI Medical History               GERD


 History                       Renal Disease


Reproductive Disorders           Breast Cancer,Uterine Cancer


Pyscho-Social History            Depression


Communicable Disease             No Pertinent History





Diet Order (Last 24 Hours)





08/30/20 Lunch


Heart-Healthy  Diet 








                           Lab Results (Last 24 Hours)











  08/30/20 08/30/20 08/30/20 Range/Units





  Unknown 17:45 14:30 


 


WBC     (4.0-10.5)  K/mm3


 


RBC     (4.1-5.4)  M/mm3


 


Hgb     (12.0-16.0)  gm/dl


 


Hct     (35-47)  %


 


MCV     ()  fl


 


MCH     (26-32)  pg


 


MCHC     (32-36)  g/dl


 


RDW     (11.5-14.0)  %


 


Plt Count     (150-450)  K/mm3


 


MPV     (7.5-11.0)  fl


 


Gran %     (36.0-66.0)  %


 


Eos # (Auto)     (0-0.5)  


 


Absolute Lymphs (auto)     (1.0-4.6)  


 


Absolute Monos (auto)     (0.0-1.3)  


 


Lymphocytes %     (24.0-44.0)  %


 


Monocytes %     (0.0-12.0)  %


 


Eosinophils %     (0.00-5.0)  %


 


Basophils %     (0.0-0.4)  %


 


Absolute Granulocytes     (1.4-6.9)  


 


Basophils #     (0-0.4)  


 


Sodium     (137-145)  mmol/L


 


Potassium     (3.5-5.1)  mmol/L


 


Chloride     ()  mmol/L


 


Carbon Dioxide     (22-30)  mmol/L


 


Anion Gap     (5-15)  MEQ/L


 


BUN     (7-17)  mg/dL


 


Creatinine     (0.52-1.04)  mg/dL


 


Estimated GFR     ML/MIN


 


Glucose     ()  mg/dL


 


Lactic Acid     (0.4-2.0)  


 


Calcium     (8.4-10.2)  mg/dL


 


Magnesium     (1.6-2.3)  mg/dL


 


Total Bilirubin     (0.2-1.3)  mg/dL


 


AST     (14-36)  U/L


 


ALT     (0-35)  U/L


 


Alkaline Phosphatase     ()  U/L


 


Troponin I   < 0.012  < 0.012  (0.000-0.034)  ng/mL


 


NT-Pro-B Natriuret Pep     (0-900)  pg/mL


 


Serum Total Protein     (6.3-8.2)  g/dL


 


Albumin     (3.5-5.0)  g/dL


 


Urine Color     (YELLOW)  


 


Urine Appearance     (CLEAR)  


 


Urine pH     (5-6)  


 


Ur Specific Gravity     (1.005-1.025)  


 


Urine Protein     (Negative)  


 


Urine Ketones     (NEGATIVE)  


 


Urine Blood     (0-5)  Azeem/ul


 


Urine Nitrite     (NEGATIVE)  


 


Urine Bilirubin     (NEGATIVE)  


 


Urine Urobilinogen     (0-1)  mg/dL


 


Ur Leukocyte Esterase     (NEGATIVE)  


 


Urine WBC (Auto)     (0-5)  /HPF


 


Urine RBC (Auto)     (0-2)  /HPF


 


U Epithel Cells (Auto)     (FEW)  /HPF


 


Urine Bacteria (Auto)     (NEGATIVE)  /HPF


 


Urine Culture Reflexed     (NO)  


 


Urine Glucose     (NEGATIVE)  mg/dL


 


Influenza Type A Ag     (NEGATIVE)  


 


Influenza Type B Ag     (NEGATIVE)  


 


RSV (PCR)     (Negative)  


 


SARS-CoV-2 (PCR)  NEGATIVE    (NEGATIVE)  


 


Slides for Path Review     














  08/30/20 08/30/20 08/30/20 Range/Units





  11:40 08:44 07:23 


 


WBC     (4.0-10.5)  K/mm3


 


RBC     (4.1-5.4)  M/mm3


 


Hgb     (12.0-16.0)  gm/dl


 


Hct     (35-47)  %


 


MCV     ()  fl


 


MCH     (26-32)  pg


 


MCHC     (32-36)  g/dl


 


RDW     (11.5-14.0)  %


 


Plt Count     (150-450)  K/mm3


 


MPV     (7.5-11.0)  fl


 


Gran %     (36.0-66.0)  %


 


Eos # (Auto)     (0-0.5)  


 


Absolute Lymphs (auto)     (1.0-4.6)  


 


Absolute Monos (auto)     (0.0-1.3)  


 


Lymphocytes %     (24.0-44.0)  %


 


Monocytes %     (0.0-12.0)  %


 


Eosinophils %     (0.00-5.0)  %


 


Basophils %     (0.0-0.4)  %


 


Absolute Granulocytes     (1.4-6.9)  


 


Basophils #     (0-0.4)  


 


Sodium     (137-145)  mmol/L


 


Potassium     (3.5-5.1)  mmol/L


 


Chloride     ()  mmol/L


 


Carbon Dioxide     (22-30)  mmol/L


 


Anion Gap     (5-15)  MEQ/L


 


BUN     (7-17)  mg/dL


 


Creatinine     (0.52-1.04)  mg/dL


 


Estimated GFR     ML/MIN


 


Glucose     ()  mg/dL


 


Lactic Acid     (0.4-2.0)  


 


Calcium     (8.4-10.2)  mg/dL


 


Magnesium     (1.6-2.3)  mg/dL


 


Total Bilirubin     (0.2-1.3)  mg/dL


 


AST     (14-36)  U/L


 


ALT     (0-35)  U/L


 


Alkaline Phosphatase     ()  U/L


 


Troponin I  < 0.012  < 0.012   (0.000-0.034)  ng/mL


 


NT-Pro-B Natriuret Pep     (0-900)  pg/mL


 


Serum Total Protein     (6.3-8.2)  g/dL


 


Albumin     (3.5-5.0)  g/dL


 


Urine Color    STRAW  (YELLOW)  


 


Urine Appearance    CLEAR  (CLEAR)  


 


Urine pH    6.0  (5-6)  


 


Ur Specific Gravity    1.008  (1.005-1.025)  


 


Urine Protein    NEGATIVE  (Negative)  


 


Urine Ketones    NEGATIVE  (NEGATIVE)  


 


Urine Blood    NEGATIVE  (0-5)  Azeem/ul


 


Urine Nitrite    NEGATIVE  (NEGATIVE)  


 


Urine Bilirubin    NEGATIVE  (NEGATIVE)  


 


Urine Urobilinogen    NEGATIVE  (0-1)  mg/dL


 


Ur Leukocyte Esterase    NEGATIVE  (NEGATIVE)  


 


Urine WBC (Auto)    NONE  (0-5)  /HPF


 


Urine RBC (Auto)    NONE  (0-2)  /HPF


 


U Epithel Cells (Auto)    NONE  (FEW)  /HPF


 


Urine Bacteria (Auto)    NONE  (NEGATIVE)  /HPF


 


Urine Culture Reflexed    ORDERED SEPARATELY  (NO)  


 


Urine Glucose    NEGATIVE  (NEGATIVE)  mg/dL


 


Influenza Type A Ag     (NEGATIVE)  


 


Influenza Type B Ag     (NEGATIVE)  


 


RSV (PCR)     (Negative)  


 


SARS-CoV-2 (PCR)     (NEGATIVE)  


 


Slides for Path Review     














  08/30/20 08/30/20 08/30/20 Range/Units





  06:12 06:12 06:12 


 


WBC    6.3  (4.0-10.5)  K/mm3


 


RBC    3.55 L  (4.1-5.4)  M/mm3


 


Hgb    8.0 L  (12.0-16.0)  gm/dl


 


Hct    28.1 L  (35-47)  %


 


MCV    79.2  ()  fl


 


MCH    22.5 L  (26-32)  pg


 


MCHC    28.5 L  (32-36)  g/dl


 


RDW    20.3 H  (11.5-14.0)  %


 


Plt Count    158  (150-450)  K/mm3


 


MPV    10.0  (7.5-11.0)  fl


 


Gran %    57.9  (36.0-66.0)  %


 


Eos # (Auto)    0.11  (0-0.5)  


 


Absolute Lymphs (auto)    2.10  (1.0-4.6)  


 


Absolute Monos (auto)    0.40  (0.0-1.3)  


 


Lymphocytes %    33.4  (24.0-44.0)  %


 


Monocytes %    6.4  (0.0-12.0)  %


 


Eosinophils %    1.7  (0.00-5.0)  %


 


Basophils %    0.6  (0.0-0.4)  %


 


Absolute Granulocytes    3.64  (1.4-6.9)  


 


Basophils #    0.04  (0-0.4)  


 


Sodium   137   (137-145)  mmol/L


 


Potassium   3.5   (3.5-5.1)  mmol/L


 


Chloride   105   ()  mmol/L


 


Carbon Dioxide   23   (22-30)  mmol/L


 


Anion Gap   13.1   (5-15)  MEQ/L


 


BUN   15   (7-17)  mg/dL


 


Creatinine   0.84   (0.52-1.04)  mg/dL


 


Estimated GFR   > 60.0   ML/MIN


 


Glucose   130 H   ()  mg/dL


 


Lactic Acid     (0.4-2.0)  


 


Calcium   8.8   (8.4-10.2)  mg/dL


 


Magnesium   2.1   (1.6-2.3)  mg/dL


 


Total Bilirubin   0.30   (0.2-1.3)  mg/dL


 


AST   81 H   (14-36)  U/L


 


ALT   41 H   (0-35)  U/L


 


Alkaline Phosphatase   144 H   ()  U/L


 


Troponin I  < 0.012    (0.000-0.034)  ng/mL


 


NT-Pro-B Natriuret Pep   43.6   (0-900)  pg/mL


 


Serum Total Protein   7.3   (6.3-8.2)  g/dL


 


Albumin   4.1   (3.5-5.0)  g/dL


 


Urine Color     (YELLOW)  


 


Urine Appearance     (CLEAR)  


 


Urine pH     (5-6)  


 


Ur Specific Gravity     (1.005-1.025)  


 


Urine Protein     (Negative)  


 


Urine Ketones     (NEGATIVE)  


 


Urine Blood     (0-5)  Azeem/ul


 


Urine Nitrite     (NEGATIVE)  


 


Urine Bilirubin     (NEGATIVE)  


 


Urine Urobilinogen     (0-1)  mg/dL


 


Ur Leukocyte Esterase     (NEGATIVE)  


 


Urine WBC (Auto)     (0-5)  /HPF


 


Urine RBC (Auto)     (0-2)  /HPF


 


U Epithel Cells (Auto)     (FEW)  /HPF


 


Urine Bacteria (Auto)     (NEGATIVE)  /HPF


 


Urine Culture Reflexed     (NO)  


 


Urine Glucose     (NEGATIVE)  mg/dL


 


Influenza Type A Ag     (NEGATIVE)  


 


Influenza Type B Ag     (NEGATIVE)  


 


RSV (PCR)     (Negative)  


 


SARS-CoV-2 (PCR)     (NEGATIVE)  


 


Slides for Path Review    YES  














  08/30/20 08/30/20 Range/Units





  06:10 05:20 


 


WBC    (4.0-10.5)  K/mm3


 


RBC    (4.1-5.4)  M/mm3


 


Hgb    (12.0-16.0)  gm/dl


 


Hct    (35-47)  %


 


MCV    ()  fl


 


MCH    (26-32)  pg


 


MCHC    (32-36)  g/dl


 


RDW    (11.5-14.0)  %


 


Plt Count    (150-450)  K/mm3


 


MPV    (7.5-11.0)  fl


 


Gran %    (36.0-66.0)  %


 


Eos # (Auto)    (0-0.5)  


 


Absolute Lymphs (auto)    (1.0-4.6)  


 


Absolute Monos (auto)    (0.0-1.3)  


 


Lymphocytes %    (24.0-44.0)  %


 


Monocytes %    (0.0-12.0)  %


 


Eosinophils %    (0.00-5.0)  %


 


Basophils %    (0.0-0.4)  %


 


Absolute Granulocytes    (1.4-6.9)  


 


Basophils #    (0-0.4)  


 


Sodium    (137-145)  mmol/L


 


Potassium    (3.5-5.1)  mmol/L


 


Chloride    ()  mmol/L


 


Carbon Dioxide    (22-30)  mmol/L


 


Anion Gap    (5-15)  MEQ/L


 


BUN    (7-17)  mg/dL


 


Creatinine    (0.52-1.04)  mg/dL


 


Estimated GFR    ML/MIN


 


Glucose    ()  mg/dL


 


Lactic Acid   1.3  (0.4-2.0)  


 


Calcium    (8.4-10.2)  mg/dL


 


Magnesium    (1.6-2.3)  mg/dL


 


Total Bilirubin    (0.2-1.3)  mg/dL


 


AST    (14-36)  U/L


 


ALT    (0-35)  U/L


 


Alkaline Phosphatase    ()  U/L


 


Troponin I    (0.000-0.034)  ng/mL


 


NT-Pro-B Natriuret Pep    (0-900)  pg/mL


 


Serum Total Protein    (6.3-8.2)  g/dL


 


Albumin    (3.5-5.0)  g/dL


 


Urine Color    (YELLOW)  


 


Urine Appearance    (CLEAR)  


 


Urine pH    (5-6)  


 


Ur Specific Gravity    (1.005-1.025)  


 


Urine Protein    (Negative)  


 


Urine Ketones    (NEGATIVE)  


 


Urine Blood    (0-5)  Azeem/ul


 


Urine Nitrite    (NEGATIVE)  


 


Urine Bilirubin    (NEGATIVE)  


 


Urine Urobilinogen    (0-1)  mg/dL


 


Ur Leukocyte Esterase    (NEGATIVE)  


 


Urine WBC (Auto)    (0-5)  /HPF


 


Urine RBC (Auto)    (0-2)  /HPF


 


U Epithel Cells (Auto)    (FEW)  /HPF


 


Urine Bacteria (Auto)    (NEGATIVE)  /HPF


 


Urine Culture Reflexed    (NO)  


 


Urine Glucose    (NEGATIVE)  mg/dL


 


Influenza Type A Ag  NEGATIVE   (NEGATIVE)  


 


Influenza Type B Ag  NEGATIVE   (NEGATIVE)  


 


RSV (PCR)  NEGATIVE   (Negative)  


 


SARS-CoV-2 (PCR)    (NEGATIVE)  


 


Slides for Path Review    








                           Lactic Acid (Last 24 Hours)











  08/30/20





  05:20


 


Lactic Acid  1.3








                      Microbiology Results (Last 24 Hours)











 08/30/20 07:30 Urine Culture - Pending





 Catherized 


 


 08/30/20 06:12 Blood Culture Gram Stain - Pending





 Blood Blood Culture - Pending


 


 08/30/20 06:12 Blood Culture Gram Stain - Pending





 Blood Blood Culture - Pending








                             Orders (Last 24 Hours)











 Category Date Time Status


 


 Up Ad Krysten ROUTINE Activity  08/30/20 10:04 Active


 


 Call Admit Doctor for Orders ON ADMISSION Care  08/30/20 10:04 Active


 


 Code Status Order ROUTINE Care  08/30/20 10:04 Active


 


 Eddy [Catheter-[place] Eddy] STAT Care  08/30/20 07:23 Active


 


 IV Care Q6H Care  08/30/20 10:04 Active


 


 Place in Observation ROUTINE Care  08/30/20 10:04 Active


 


 Barrett Vasquez ROUTINE Care  08/30/20 10:04 Active


 


 Telemetry q6h Care  08/30/20 12:26 Active


 


 Cardio-Pulmonary Rehab .as ordered Cons  08/30/20 10:33 Active


 


 /Discharge Plan ROUTINE Cons  08/30/20 10:33 Active


 


 Heart-Healthy  Diet Diet  08/30/20 Lunch Active


 


 Nutritional Admission Screen ONCE Diet  08/30/20 10:33 Active


 


 BLOOD CULTURE Stat Lab  08/30/20 06:12 Received


 


 CBC W DIFF AM.LAB Lab  08/31/20 04:00 Ordered


 


 CMP AM.LAB Lab  08/31/20 04:00 Ordered


 


 CULTURE,URINE Stat Lab  08/30/20 07:30 Received


 


 Acetaminophen 325 mg*** [Tylenol 325 mg***] Med  08/30/20 10:04 Active





 650 mg PO Q4H PRN PRN   


 


 Albuterol 2.5 mg/3 ml Neb*** [Proventil 2.5 mg/3 ml Neb Med  08/30/20 11:15 

Active





 ***]   





 2.5 mg IH Q4H PRN PRN   


 


 Azithromycin 500 mg/250 ml [Zithromax 500 MG/ 250 ML Med  08/31/20 10:00 Active





 NaCl Premix]   





 500 mg in 250 ml IV Q24H10   


 


 Ceftriaxone 1 GM/50 ML PREMIX* [ROCEPHIN 1 Gm-D5w 50 ml Med  08/31/20 10:00 

Active





 Bag**]   





 1 g in 50 ml IV Q24H10   


 


 Famotidine 20 mg Vial*** [Pepcid 20 MG VIAL***] Med  08/30/20 10:04 Active





 20 mg IV Q12HT   


 


 Furosemide 40 mg*** [Lasix 40 MG***] Med  08/30/20 14:00 Active





 40 mg PO DAILY   


 


 Gabapentin 300 mg*** [Neurontin 300 mg***] Med  08/30/20 15:00 Active





 600 mg PO TID   


 


 Lorazepam 2 mg/1 ml*** [Ativan 2 MG/1 ML VIAL***] Med  08/30/20 12:24 Active





 1 mg IV Q4H PRN PRN   


 


 Metoprolol Succinate 50 mg*** [Toprol Xl 50 MG***] Med  08/30/20 14:00 Active





 50 mg PO DAILY   


 


 Morphine Sulfate 4 mg Inj*** Med  08/30/20 10:23 Active





 4 mg IV Q2H PRN PRN   


 


 Multivitamins,Therapeutic Tab* [Theragran Multivitamin* Med  08/30/20 14:00 

Active





 **]   





 1 tab PO DAILY   


 


 NaCl 0.9% 1000 ml [Sodium Chloride 0.9% 1000 ML] 1,000 Med  08/30/20 10:04 

Active





 ml   





  mls/hr   


 


 Nicotine 21 mg** [Nicoderm CQ 21 MG***] Med  08/30/20 15:45 Ordered





 21 mg TOP Q24H   


 


 PANTOPRAZOLE 40 mg Tablet*** [Protonix 40MG Tablet***] Med  08/31/20 10:00 

Active





 40 mg PO DAILY   


 


 Patient Own Med [Patient Own Medication] Med  08/30/20 20:15 Ordered





 2 each IH BIDRT   


 


 Venlafaxine HCl ER 75 mg*** [Effexor XR 75 MG***] Med  08/30/20 14:00 Active





 225 mg PO DAILY   


 


 Vitamin B Comp W-C*** [Barbara-Bee with C] Med  08/30/20 14:00 Active





 1 tab PO DAILY   


 


 Oxygen Nasal Cannula 2 lpm RT  08/30/20 10:04 Active


 


 Pulse Oximetry CONTINUOUS RT  08/30/20 10:04 Active


 


 Respiratory MDI BID RT  08/30/20 19:00 Active


 


 Respiratory Therapy Assessment DAILY RT  08/30/20 14:37 Active








                          Nursing Notes (Last 12 hours)





08/30/20 16:54 Nursing Note by Paula Hawk


1400  Dial a flow used d/t patient keeps bending her arm and does not want an 

new IV stick in a different area.





Initialized on 08/30/20 16:54 - END OF NOTE











                            Active Visit Medications











Generic Name Dose Route Start Last Admin





  Trade Name Freq  PRN Reason Stop Dose Admin


 


Acetaminophen  650 mg  08/30/20 10:04 





  Tylenol 325 Mg***  PO  09/29/20 10:03 





  Q4H PRN PRN  





  PAIN AND/OR FEVER  


 


Albuterol Sulfate  2.5 mg  08/30/20 11:15  08/30/20 20:04





  Proventil 2.5 Mg/3 Ml Neb***  IH  09/29/20 11:14  2.5 mg





  Q4H PRN PRN   Administration





  SHORTNESS OF BREATH/WHEEZING  


 


Famotidine  20 mg  08/30/20 10:04  08/30/20 21:34





  Pepcid 20 Mg Vial***  IV  09/29/20 10:03  20 mg





  Q12HT LISA   Administration


 


Furosemide  40 mg  08/30/20 14:00  08/30/20 13:46





  Lasix 40 Mg***  PO  09/29/20 13:59  40 mg





  DAILY LISA   Administration


 


Gabapentin  600 mg  08/30/20 15:00  08/30/20 21:34





  Neurontin 300 Mg***  PO  09/29/20 14:59  600 mg





  TID LISA   Administration


 


Sodium Chloride  1,000 mls @ 100 mls/hr  08/30/20 10:04  08/30/20 12:06





  Sodium Chloride 0.9% 1000 Ml  IV  09/29/20 10:03  100 mls/hr





  .Q10H LISA   Administration


 


Azithromycin  500 mg in 250 mls @ 250 mls/hr  08/31/20 10:00 





  Zithromax 500 Mg/ 250 Ml Nacl Premix  IV  09/30/20 09:59 





  Q24H10 LISA  


 


Ceftriaxone Sodium/Dextrose  1 g in 50 mls @ 100 mls/hr  08/31/20 10:00 





  Rocephin 1 Gm-D5w 50 Ml Bag**  IV  09/30/20 09:59 





  Q24H10 LISA  


 


Lorazepam  1 mg  08/30/20 12:24  08/31/20 02:20





  Ativan 2 Mg/1 Ml Vial***  IV  09/29/20 12:23  1 mg





  Q4H PRN PRN   Administration





  ANXIETY/AGITATION  


 


Metoprolol Succinate  50 mg  08/30/20 14:00  08/30/20 13:46





  Toprol Xl 50 Mg***  PO  09/29/20 13:59  50 mg





  DAILY LISA   Administration


 


Morphine Sulfate  4 mg  08/30/20 10:23  08/31/20 01:55





  Morphine Sulfate 4 Mg Inj***  IV  09/04/20 10:22  4 mg





  Q2H PRN PRN   Administration





  PAIN  


 


Multivitamins  1 tab  08/30/20 14:00  08/30/20 13:46





  Barbara-Bee With C  PO  09/29/20 13:59  1 tab





  DAILY LISA   Administration


 


Multivitamins Therapeutic  1 tab  08/30/20 14:00  08/30/20 13:45





  Theragran Multivitamin***  PO  09/29/20 13:59  1 tab





  DAILY LISA   Administration


 


Nicotine  21 mg  08/30/20 15:45  08/30/20 16:52





  Nicoderm Cq 21 Mg***  TOP  09/29/20 15:44  21 mg





  Q24H LISA   Administration


 


Pantoprazole Sodium  40 mg  08/31/20 10:00 





  Protonix 40mg Tablet***  PO  09/30/20 09:59 





  DAILY LISA  


 


Patient Own Medication  2 each  08/30/20 20:15  08/30/20 20:04





  Patient Own Medication  IH  09/29/20 18:59  2 each





  BIDRT LISA   Administration


 


Venlafaxine HCl  225 mg  08/30/20 14:00  08/30/20 13:46





  Effexor Xr 75 Mg***  PO  09/29/20 13:59  225 mg





  DAILY LISA   Administration








                                Home Medications











 Medication  Instructions  Recorded  Confirmed  Last Taken  Type


 


Budesonide/Formoterol Fumarate 2 puffs IH QAM 08/30/20 08/30/20 08/29/20 History





[Symbicort 80-4.5 Mcg Inhaler]     


 


Furosemide [Lasix] 40 mg PO DAILY 08/30/20 08/30/20 08/29/20 History


 


Gabapentin 600 mg PO TID 08/30/20 08/30/20 08/29/20 History





    . 


 


Metoprolol Succinate 50 mg PO DAILY 08/30/20 08/30/20 08/29/20 History


 


Multivitamin 1 tab PO DAILY 08/30/20 08/30/20 08/29/20 History


 


Omeprazole 40 mg PO DAILY 08/30/20 08/30/20 08/29/20 History


 


Venlafaxine HCl ER 75 mg*** 225 mg PO DAILY 08/30/20 08/30/20 08/29/20 History





[Effexor XR 75 MG***]     


 


Vitamin B Complex 2,000 mg PO DAILY 08/30/20 08/30/20 08/29/20 History














Initialized on 08/31/20 03:08 - END OF NOTE








08/30/20 16:54 Nursing Note by Sutliff,Paula


1400  Dial a flow used d/t patient keeps bending her arm and does not want an 

new IV stick in a different area.





Initialized on 08/30/20 16:54 - END OF NOTE

















- Vitals & Intake/Output


Vital Signs: 





                                   Vital Signs











Temperature  98.7 F   08/31/20 00:39


 


Pulse Rate  97 H  08/31/20 00:39


 


Respiratory Rate  20   08/31/20 00:39


 


Blood Pressure  180/97   08/31/20 00:52


 


O2 Sat by Pulse Oximetry  93 L  08/31/20 07:30











Intake & Output: 





                                 Intake & Output











 08/28/20 08/29/20 08/30/20 08/31/20





 11:59 11:59 11:59 11:59


 


Intake Total    1080


 


Output Total   200 5050


 


Balance   -200 -3970


 


Weight   109 kg 














- Lab


Result Diagrams: 


                                 08/30/20 06:12





                                 08/30/20 06:12


Lab Results-Last 24 Hrs: 





                            Lab Results-Last 24 Hours











  08/30/20 08/30/20 08/30/20 Range/Units





  11:40 14:30 17:45 


 


Troponin I  < 0.012  < 0.012  < 0.012  (0.000-0.034)  ng/mL











Micro Results-Entire Visit: 





                                  Microbiology











 08/30/20 06:12 Blood Culture - Preliminary





 Blood    NO GROWTH TO DATE


 


 08/30/20 06:12 Blood Culture - Preliminary





 Blood    NO GROWTH TO DATE


 


 08/30/20 07:30 Urine Culture - Preliminary





 Catherized    NO GROWTH TO DATE














- Radiology Exams


Ordered Rad Exams-Entire Visit: 





                              Radiology Procedures











 Category Date Time Status


 


 ABDOMEN WITHOUT CONTRAST [CT] Urgent Exams  08/31/20 06:33 Completed


 


 CHEST 1 VIEW (PORTABLE) Stat Exams  08/30/20 05:21 Completed


 


 HEAD WITHOUT CONTRAST [CT] Urgent Exams  08/31/20 06:33 Completed














- Procedures and Test


Procedures and Tests throughout Hospitalization: 





                            Therapy Orders & Screens





08/30/20 10:04


Oxygen Nasal Cannula 2 lpm 


   Comment: 


Respiratory Therapy Consult ROUTINE 


   Comment: 


   Reason For Exam: 





08/30/20 10:33


RT Screen per Nursing Assess ONCE 


   Comment: Protocol Order


   Physician Instructions: Greater than 3 points order RT Admission Screen


   Reason For Exam: Triggered on Admission


   Diagnosis: shortness of breath for 2-3 days,


   Diagnosis: shortness of breath for 2-3 days,


   Pneumonia: Yes


   Home O2: Yes


   Asthma: Yes


   CHF: No


   Home CPAP/BIPAP: No


   Home Nebs/MDI: Yes


   Total Points: 17


Smoking Cessation Education ONCE 


   Comment: 


   Diagnosis: shortness of breath for 2-3 days,


   Smoking Status: Current some day smoker


   How long have you smoked: 40 yrs


   Have you smoked in the past 12 months: Yes


   Approximately how many cigarettes per day: 3


   Do you dip or chew tobacco: No





08/30/20 14:37


Respiratory Therapy Assessment DAILY 


   Comment: 


   Diagnosis: shortness of breath for 2-3 days,





08/30/20 19:00


Respiratory MDI BID 


   Comment: 


   Diagnosis: shortness of breath for 2-3 days,














Discharge Exam


General Appearance: no apparent distress, lethargy


Neurologic Exam: alert, oriented x 3, cooperative, normal mood/affect, nml 

cerebellar function, sensation nml, No motor deficits


Eye Exam: PERRL, EOMI, eyes nml inspection


Ears, Nose, Throat Exam: normal ENT inspection, pharynx normal, moist mucous m

embranes


Neck Exam: normal inspection, non-tender, supple, full range of motion


Respiratory Exam: normal breath sounds, lungs clear, No respiratory distress


Cardiovascular Exam: regular rate/rhythm, normal heart sounds


Gastrointestinal/Abdomen Exam: soft, No tenderness, No mass


Pelvic Exam: deferred


Rectal Exam: deferred


Back Exam: normal inspection, normal range of motion, No CVA tenderness, No 

vertebral tenderness


Extremity Exam: normal inspection, normal range of motion


Skin Exam: normal color, warm, dry





Final Diagnosis/Problem List





- Final Discharge Diagnosis/Problem


(1) Pneumonia of right lung due to infectious organism


Current Visit: Yes   Status: Acute   


Assessment & Plan: 


improved


Code(s): J18.9 - PNEUMONIA, UNSPECIFIED ORGANISM   





(2) Breast cancer in female


Current Visit: Yes   Status: Acute   Code(s): C50.919 - MALIGNANT NEOPLASM OF 

UNSP SITE OF UNSPECIFIED FEMALE BREAST   





- Discharge


Discharge Date: 08/31/20


Disposition: Home, Self-Care


Condition: Stable


Prescriptions: 


New


   cephALEXin [Cephalexin] 500 mg PO QID 7 Days #28 tablet





Continue


   Gabapentin 600 mg PO TID


   Venlafaxine HCl ER 75 mg*** [Effexor XR 75 MG***] 225 mg PO DAILY


   Omeprazole 40 mg PO DAILY


   Metoprolol Succinate 50 mg PO DAILY


   Furosemide [Lasix] 40 mg PO DAILY


   Budesonide/Formoterol Fumarate [Symbicort 80-4.5 Mcg Inhaler] 2 puffs IH QAM


   Vitamin B Complex 2,000 mg PO DAILY


   Multivitamin 1 tab PO DAILY


Instructions:  Pneumonia, Adult (DC)


Additional Instructions: 


DR SHAW APPOINTMENT AT [facility] OFFICE


Follow up with: 


ASHLEE SHAW MD [ACTIVE STAFF] - 09/01/20 1:30 pm

## 2020-09-15 ENCOUNTER — HOSPITAL ENCOUNTER (OUTPATIENT)
Dept: HOSPITAL 33 - ED | Age: 60
Setting detail: OBSERVATION
Discharge: HOME | End: 2020-09-15
Attending: INTERNAL MEDICINE | Admitting: INTERNAL MEDICINE
Payer: MEDICAID

## 2020-09-15 VITALS — HEART RATE: 90 BPM | OXYGEN SATURATION: 98 % | DIASTOLIC BLOOD PRESSURE: 94 MMHG | SYSTOLIC BLOOD PRESSURE: 147 MMHG

## 2020-09-15 DIAGNOSIS — Z85.118: ICD-10-CM

## 2020-09-15 DIAGNOSIS — J44.9: ICD-10-CM

## 2020-09-15 DIAGNOSIS — F17.200: ICD-10-CM

## 2020-09-15 DIAGNOSIS — R51: ICD-10-CM

## 2020-09-15 DIAGNOSIS — I10: ICD-10-CM

## 2020-09-15 DIAGNOSIS — E78.00: ICD-10-CM

## 2020-09-15 DIAGNOSIS — S00.03XA: ICD-10-CM

## 2020-09-15 DIAGNOSIS — F10.929: Primary | ICD-10-CM

## 2020-09-15 DIAGNOSIS — Z79.899: ICD-10-CM

## 2020-09-15 DIAGNOSIS — E11.9: ICD-10-CM

## 2020-09-15 DIAGNOSIS — Z85.3: ICD-10-CM

## 2020-09-15 DIAGNOSIS — M54.2: ICD-10-CM
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
## 2020-09-15 LAB
ALBUMIN SERPL-MCNC: 4.4 G/DL (ref 3.5–5)
ALP SERPL-CCNC: 102 U/L (ref 38–126)
ALT SERPL-CCNC: 103 U/L (ref 0–35)
ANION GAP SERPL CALC-SCNC: 16.2 MEQ/L (ref 5–15)
AST SERPL QL: 189 U/L (ref 14–36)
BASOPHILS # BLD AUTO: 0.05 10*3/UL (ref 0–0.4)
BASOPHILS NFR BLD AUTO: 0.6 % (ref 0–0.4)
BILIRUB BLD-MCNC: 0.3 MG/DL (ref 0.2–1.3)
BLD SMEAR INTERP: YES
BUN SERPL-MCNC: 14 MG/DL (ref 7–17)
CALCIUM SPEC-MCNC: 8.9 MG/DL (ref 8.4–10.2)
CHLORIDE SERPL-SCNC: 94 MMOL/L (ref 98–107)
CO2 SERPL-SCNC: 24 MMOL/L (ref 22–30)
CREAT SERPL-MCNC: 1.46 MG/DL (ref 0.52–1.04)
EOSINOPHIL # BLD AUTO: 0.16 10*3/UL (ref 0–0.5)
GFR SERPLBLD BASED ON 1.73 SQ M-ARVRAT: 39 ML/MIN
GLUCOSE SERPL-MCNC: 115 MG/DL (ref 74–106)
HCT VFR BLD AUTO: 28.9 % (ref 35–47)
HGB BLD-MCNC: 8.3 GM/DL (ref 12–16)
LYMPHOCYTES # SPEC AUTO: 4.05 10*3/UL (ref 1–4.6)
MCH RBC QN AUTO: 22 PG (ref 26–32)
MCHC RBC AUTO-ENTMCNC: 28.7 G/DL (ref 32–36)
MONOCYTES # BLD AUTO: 0.66 10*3/UL (ref 0–1.3)
PLATELET # BLD AUTO: 290 K/MM3 (ref 150–450)
POTASSIUM SERPLBLD-SCNC: 3.4 MMOL/L (ref 3.5–5.1)
PROT SERPL-MCNC: 7.6 G/DL (ref 6.3–8.2)
RBC # BLD AUTO: 3.77 M/MM3 (ref 4.1–5.4)
SODIUM SERPL-SCNC: 131 MMOL/L (ref 137–145)
WBC # BLD AUTO: 8.3 K/MM3 (ref 4–10.5)

## 2020-09-15 PROCEDURE — 84484 ASSAY OF TROPONIN QUANT: CPT

## 2020-09-15 PROCEDURE — 99284 EMERGENCY DEPT VISIT MOD MDM: CPT

## 2020-09-15 PROCEDURE — 36415 COLL VENOUS BLD VENIPUNCTURE: CPT

## 2020-09-15 PROCEDURE — 36000 PLACE NEEDLE IN VEIN: CPT

## 2020-09-15 PROCEDURE — 85025 COMPLETE CBC W/AUTO DIFF WBC: CPT

## 2020-09-15 PROCEDURE — 80307 DRUG TEST PRSMV CHEM ANLYZR: CPT

## 2020-09-15 PROCEDURE — 72125 CT NECK SPINE W/O DYE: CPT

## 2020-09-15 PROCEDURE — G0480 DRUG TEST DEF 1-7 CLASSES: HCPCS

## 2020-09-15 PROCEDURE — 96374 THER/PROPH/DIAG INJ IV PUSH: CPT

## 2020-09-15 PROCEDURE — 80053 COMPREHEN METABOLIC PANEL: CPT

## 2020-09-15 PROCEDURE — 71250 CT THORAX DX C-: CPT

## 2020-09-15 PROCEDURE — G0378 HOSPITAL OBSERVATION PER HR: HCPCS

## 2020-09-15 PROCEDURE — 70450 CT HEAD/BRAIN W/O DYE: CPT

## 2020-09-15 PROCEDURE — 93268 ECG RECORD/REVIEW: CPT
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
## 2022-03-05 ENCOUNTER — HOSPITAL ENCOUNTER (EMERGENCY)
Dept: HOSPITAL 33 - ED | Age: 62
Discharge: HOME | End: 2022-03-05
Payer: COMMERCIAL

## 2022-03-05 VITALS — OXYGEN SATURATION: 97 % | HEART RATE: 106 BPM | SYSTOLIC BLOOD PRESSURE: 132 MMHG | DIASTOLIC BLOOD PRESSURE: 83 MMHG

## 2022-03-05 DIAGNOSIS — J44.9: ICD-10-CM

## 2022-03-05 DIAGNOSIS — I10: Primary | ICD-10-CM

## 2022-03-05 DIAGNOSIS — I25.2: ICD-10-CM

## 2022-03-05 DIAGNOSIS — Z86.718: ICD-10-CM

## 2022-03-05 DIAGNOSIS — K21.9: ICD-10-CM

## 2022-03-05 DIAGNOSIS — E78.5: ICD-10-CM

## 2022-03-05 DIAGNOSIS — Z79.891: ICD-10-CM

## 2022-03-05 DIAGNOSIS — Z79.899: ICD-10-CM

## 2022-03-05 DIAGNOSIS — Z72.0: ICD-10-CM

## 2022-03-05 LAB
ALBUMIN SERPL-MCNC: 4.6 G/DL (ref 3.5–5)
ALP SERPL-CCNC: 132 U/L (ref 38–126)
ALT SERPL-CCNC: 70 U/L (ref 0–35)
ANION GAP SERPL CALC-SCNC: 14 MEQ/L (ref 5–15)
AST SERPL QL: 101 U/L (ref 14–36)
BASOPHILS # BLD AUTO: 0.03 10*3/UL (ref 0–0.4)
BILIRUB BLD-MCNC: 0.6 MG/DL (ref 0.2–1.3)
BUN SERPL-MCNC: 17 MG/DL (ref 7–17)
CALCIUM SPEC-MCNC: 9.3 MG/DL (ref 8.4–10.2)
CHLORIDE SERPL-SCNC: 97 MMOL/L (ref 98–107)
CO2 SERPL-SCNC: 29 MMOL/L (ref 22–30)
CREAT SERPL-MCNC: 0.52 MG/DL (ref 0.52–1.04)
EOSINOPHIL # BLD AUTO: 0.12 10*3/UL (ref 0–0.5)
GFR SERPLBLD BASED ON 1.73 SQ M-ARVRAT: > 60 ML/MIN
GLUCOSE SERPL-MCNC: 99 MG/DL (ref 74–106)
GLUCOSE UR-MCNC: NEGATIVE MG/DL
HCT VFR BLD AUTO: 42.5 % (ref 35–47)
HGB BLD-MCNC: 14.3 GM/DL (ref 12–16)
LYMPHOCYTES # SPEC AUTO: 2.22 10*3/UL (ref 1–4.6)
MCH RBC QN AUTO: 30.5 PG (ref 26–32)
MCHC RBC AUTO-ENTMCNC: 33.6 G/DL (ref 32–36)
MONOCYTES # BLD AUTO: 0.35 10*3/UL (ref 0–1.3)
PLATELET # BLD AUTO: 119 K/MM3 (ref 150–450)
POTASSIUM SERPLBLD-SCNC: 3.8 MMOL/L (ref 3.5–5.1)
PROT SERPL-MCNC: 8 G/DL (ref 6.3–8.2)
PROT UR STRIP-MCNC: NEGATIVE MG/DL
RBC # BLD AUTO: 4.69 M/MM3 (ref 4.1–5.4)
RBC #/AREA URNS HPF: (no result) /HPF (ref 0–2)
SODIUM SERPL-SCNC: 137 MMOL/L (ref 137–145)
WBC # BLD AUTO: 5.4 K/MM3 (ref 4–10.5)
WBC #/AREA URNS HPF: (no result) /HPF (ref 0–5)

## 2022-03-05 PROCEDURE — 96374 THER/PROPH/DIAG INJ IV PUSH: CPT

## 2022-03-05 PROCEDURE — 36415 COLL VENOUS BLD VENIPUNCTURE: CPT

## 2022-03-05 PROCEDURE — 85025 COMPLETE CBC W/AUTO DIFF WBC: CPT

## 2022-03-05 PROCEDURE — 81001 URINALYSIS AUTO W/SCOPE: CPT

## 2022-03-05 PROCEDURE — 99284 EMERGENCY DEPT VISIT MOD MDM: CPT

## 2022-03-05 PROCEDURE — 80053 COMPREHEN METABOLIC PANEL: CPT
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
## 2023-03-04 ENCOUNTER — HOSPITAL ENCOUNTER (EMERGENCY)
Dept: HOSPITAL 33 - ED | Age: 63
Discharge: HOME | End: 2023-03-04
Payer: COMMERCIAL

## 2023-03-04 VITALS — DIASTOLIC BLOOD PRESSURE: 109 MMHG | SYSTOLIC BLOOD PRESSURE: 134 MMHG

## 2023-03-04 VITALS — OXYGEN SATURATION: 98 % | HEART RATE: 84 BPM

## 2023-03-04 DIAGNOSIS — Z79.891: ICD-10-CM

## 2023-03-04 DIAGNOSIS — Z72.0: ICD-10-CM

## 2023-03-04 DIAGNOSIS — M79.671: ICD-10-CM

## 2023-03-04 DIAGNOSIS — Z79.899: ICD-10-CM

## 2023-03-04 DIAGNOSIS — I10: ICD-10-CM

## 2023-03-04 DIAGNOSIS — M25.571: Primary | ICD-10-CM

## 2023-03-04 DIAGNOSIS — E78.5: ICD-10-CM

## 2023-03-04 PROCEDURE — 73630 X-RAY EXAM OF FOOT: CPT

## 2023-03-04 PROCEDURE — 73610 X-RAY EXAM OF ANKLE: CPT

## 2023-03-04 PROCEDURE — 99283 EMERGENCY DEPT VISIT LOW MDM: CPT

## 2023-03-04 NOTE — ERPHSYRPT
- History of Present Illness


Time Seen by Provider: 03/04/23 14:25


Source: patient


Exam Limitations: no limitations


Physician History: 





This is a 62-year-old morbidly obese patient of Dr. Shaw who presents with 

right foot and ankle pain that has been present for a few days and then worsened

last evening.  She did not suffer any new acute trauma.  However, approximately 

6 weeks ago patient underwent a right knee surgery and is undergoing physical 

therapy postoperatively.  Patient has had multiple different surgeries on her 

right lower extremity including the right foot and ankle.  Patient states that 

she took one of her pain medicines (hydrocodone) this morning that she was given

for her postoperative knee pain and that medication "did not touch my right foot

and ankle pain".  Patient did walk to the room with a walker.  Patient is a 

daily smoker of cigarettes.  Patient has a history of migraine headaches, DVT, 

hyperlipidemia, hypertension, COPD, asthma, DJD, fibromyalgia, bipolar disorder,

and panic disorder


Method of Injury: other


Severity of Pain-Max: moderate


Severity of Pain-Current: moderate


Lower Extremities Pain: foot: right, ankle: right


Modifying Factors: Improves With: movement


Associated Symptoms: other (Is able to bear weight but hurts to do so)


Allergies/Adverse Reactions: 








No Known Drug Allergies Allergy (Verified 03/04/23 14:21)


   





Home Medications: 








Budesonide/Formoterol Fumarate [Symbicort 80-4.5 Mcg Inhaler] 2 puffs IH QAM 

08/30/20 [History]


Furosemide [Lasix] 40 mg PO DAILY 08/30/20 [History]


Gabapentin 600 mg PO TID 08/30/20 [History]


Metoprolol Succinate 50 mg PO DAILY 08/30/20 [History]


Multivitamin 1 tab PO DAILY 08/30/20 [History]


Venlafaxine HCl ER 75 mg*** [Effexor XR 75 MG***] 300 mg PO DAILY 08/30/20 

[History]


Vitamin B Complex 2,000 mg PO DAILY 08/30/20 [History]


Aspirin/Acetaminophen/Caffeine [Excedrin Migraine Caplet] 2 each PO DAILY 

03/02/22 [History]


Lisinopril 10 mg*** [Zestril 10 MG***] 1 tab PO DAILY 03/02/22 [History]





Hx Tetanus, Diphtheria Vaccination/Date Given: Yes


Hx Influenza Vaccination/Date Given: No


Hx Pneumococcal Vaccination/Date Given: No





Travel Risk





- International Travel


Have you traveled outside of the country in past 3 weeks: No





- Coronavirus Screening


Are you exhibiting any of the following symptoms?: No


Close contact with a COVID-19 positive Pt in past 14-21 Days: No





- Vaccine Status


Have you recieved a Covid-19 vaccination: Yes


: Unknown





- Vaccination Dates


Date of 2cond Vaccination (if applicable): ?


Dates if Unknown: ?





- Review of Systems


Constitutional: No Symptoms


Eyes: No Symptoms


Ears, Nose, & Throat: No Symptoms


Respiratory: No Symptoms


Cardiac: No Symptoms


Abdominal/Gastrointestinal: No Symptoms


Genitourinary Symptoms: No Symptoms


Musculoskeletal: Joint Pain (Right foot and ankle pain), No Fall, No Injury


Skin: No Symptoms


Neurological: No Symptoms


Psychological: No Symptoms


Endocrine: No Symptoms


Hematologic/Lymphatic: No Symptoms


Immunological/Allergic: No Symptoms


All Other Systems: Reviewed and Negative





- Past Medical History


Pertinent Past Medical History: Yes


Neurological History: Migraines, Seizures


ENT History: No Pertinent History


Cardiac History: Deep Vein Thrombosis, High Cholesterol, Hypertension, 

Myocardial Infarction (MI)


Respiratory History: Asthma, COPD


Endocrine Medical History: No Pertinent History


Musculoskeletal History: Arthritis, Degenerative Disk Disease, Fibromyalgia, 

Fractures, Osteoarthritis


GI Medical History: GERD, Gallbladder Disease, Hepatitis


 History: No Pertinent History


Psycho-Social History: Bipolar, Depression, Panic Disorder, Other


Female Reproductive Disorders: Uterine Cancer


Other Medical History: hepatitis C





- Past Surgical History


Past Surgical History: Yes


Neuro Surgical History: No Pertinent History


Cardiac: Cardiac Catheterization


Respiratory: No Pertinent History


Gastrointestinal: Appendectomy, Cholecystectomy


Genitourinary: No Pertinent History


Musculoskeletal: No Pertinent History, Orthopedic Surgery


Female Surgical History: Hysterectomy, Lumpectomy


Other Surgical History: right knee surgeries,right leg





- Social History


Smoking Status: Current every day smoker


How long have you smoked: 46 years


Exposure to second hand smoke: No


Drug Use: none


Patient Lives Alone: No


Significant Family History: no pertinent family hx





- Nursing Vital Signs


Nursing Vital Signs: 


                               Initial Vital Signs











Temperature  99.3 F   03/04/23 14:23


 


Pulse Rate  92 H  03/04/23 14:23


 


Respiratory Rate  18   03/04/23 14:23


 


Blood Pressure  134/109   03/04/23 14:23


 


O2 Sat by Pulse Oximetry  99   03/04/23 14:23








                                   Pain Scale











Pain Intensity                 10

















- Physical Exam


General Appearance: no apparent distress, alert, anxiety, obese


Eyes, Ears, Nose, Throat Exam: normal ENT inspection, moist mucous membranes


Neck Exam: normal inspection, non-tender, supple, full range of motion


Cardiovascular/Respiratory Exam: chest non-tender, no respiratory distress


Gastrointestinal/Abdominal Exam: non-tender


Back Exam: normal inspection, normal range of motion, No CVA tenderness, No 

vertebral tenderness


Hips Exam: bilateral: non-tender, normal inspection, normal range of motion, no 

evidence of injury


Legs Exam: bilateral leg: non-tender, normal inspection, normal range of motion,

 no evidence of injury


Knees Exam: right knee: soft tissue tenderness (Anterior knee with some mild 

postoperative swelling and tenderness.  These are postoperative findings), 

swelling (Postoperative), left knee: non-tender, normal inspection, normal range

 of motion, no evidence of injury


Ankle Exam: right ankle: soft tissue tenderness (Right lateral aspect), left 

ankle: non-tender, bilateral ankle: normal inspection, normal range of motion, 

no evidence of injury


Foot Exam: right foot: soft tissue tenderness (Right foot dorsal right aspect), 

left foot: non-tender, bilateral foot: normal inspection, normal range of 

motion, no evidence of injury


Neuro/Tendon Exam: normal sensation, normal motor functions, normal tendon 

functions, responds to pain, no evidence tendon injury


Mental Status Exam: alert, oriented x 3


Skin Exam: normal color, warm, dry


**SpO2 Interpretation**: normal


SpO2: 99


O2 Delivery: Room Air





- Course


Nursing assessment & vital signs reviewed: Yes


Ordered Tests: 


                               Active Orders 24 hr











 Category Date Time Status


 


 ANKLE (3 VIEWS) Stat Exams  03/04/23 14:29 Taken


 


 FOOT (MINIMUM 3 VIEWS) Stat Exams  03/04/23 14:29 Taken














- Progress


Progress: improved, pain not gone completely


Progress Note: 





03/04/23 15:12


The radiologist report was reviewed by me.  I sent the x-ray studies to 

radiology because this patient has had multiple surgeries of the ankle and foot 

on the right side.  The report show that there are no acute fractures or 

dislocations of the right ankle or right foot.





This patient has medical issue that is of low complexity.  The work-up ordered 

was based on review of the patient's past medical history, medications that she 

has been taking, history of present illness and findings on physical 

examination.  The radiologist report/impressions were reviewed by me.  The plan 

is for her to receive the Percocet 10/325 here in the emergency department.  She

 did take a Norco earlier this morning.  She is also taking ibuprofen.  There is

 no evidence of cellulitis or DVT in this patient.  She did not sustain any type

 of traumatic injury or fall.  Patient is to continue her medication as 

prescribed and to follow-up with her orthopedic or podiatric surgeon for further

 evaluation and management.


Counseled pt/family regarding: diagnosis, need for follow-up, rad results





Medical Desision Making





- Discussion of managment


Reviewed:: Test results


Agreed on:: Treatment plan, need for follow-up





- Diagnostic Testing


Diagnostic test were ordered, analyzed, and reviewed by me: Yes


Radiological Interpretation: Reviewed by me, Teleradiologist Report





- Risk of complications


Low Risk: Low risk of morbidity from additional dx testing or treatment





- Departure


Departure Disposition: Home


Clinical Impression: 


 Right foot pain, Right ankle pain





Condition: Stable


Critical Care Time: No


Referrals: 


ASHLEE SHAW MD [Primary Care Provider] - Follow up/PCP as directed


Additional Instructions: 


Ice pack to area 2-3 times a day for the next 48 hours.  Keep your right lower 

extremity elevated above the level of your heart.  Continue your Tylenol and 

ibuprofen medication.  Call your orthopedic/podiatric surgeon on 3/6/2023 for 

further evaluation management.

## 2023-03-04 NOTE — XRAY
Indication: Pain and swelling.  No known injury.



Comparison: None



3 nonweightbearing views right foot demonstrates osteopenia and tiny navicular

accessory ossicle.  No other bony, articular, or soft tissue abnormalities.



Comment: Preliminary interpretation made by [facility].  No critical discrepancy.

## 2023-03-04 NOTE — XRAY
Indication: Pain and swelling.  No known injury.



Comparison: None



3 view right ankle demonstrates osteopenia and mild soft tissue swelling.  No

other bony, articular, or soft tissue abnormalities.



Comment: Preliminary interpretation made by C.  No critical discrepancy.